# Patient Record
Sex: FEMALE | Race: WHITE | NOT HISPANIC OR LATINO | Employment: OTHER | URBAN - NONMETROPOLITAN AREA
[De-identification: names, ages, dates, MRNs, and addresses within clinical notes are randomized per-mention and may not be internally consistent; named-entity substitution may affect disease eponyms.]

---

## 2017-11-15 ENCOUNTER — TRANSCRIBE ORDERS (OUTPATIENT)
Dept: ADMINISTRATIVE | Facility: HOSPITAL | Age: 62
End: 2017-11-15

## 2017-11-15 DIAGNOSIS — Z12.39 SCREENING BREAST EXAMINATION: Primary | ICD-10-CM

## 2017-12-12 ENCOUNTER — HOSPITAL ENCOUNTER (OUTPATIENT)
Dept: MAMMOGRAPHY | Facility: HOSPITAL | Age: 62
Discharge: HOME | End: 2017-12-12
Admitting: INTERNAL MEDICINE

## 2017-12-12 DIAGNOSIS — Z12.39 SCREENING BREAST EXAMINATION: ICD-10-CM

## 2017-12-12 PROCEDURE — 77067 SCR MAMMO BI INCL CAD: CPT | Performed by: RADIOLOGY

## 2017-12-12 PROCEDURE — G0202 SCR MAMMO BI INCL CAD: HCPCS

## 2017-12-12 PROCEDURE — 77063 BREAST TOMOSYNTHESIS BI: CPT | Performed by: RADIOLOGY

## 2017-12-12 PROCEDURE — 77063 BREAST TOMOSYNTHESIS BI: CPT

## 2018-02-12 ENCOUNTER — TRANSCRIBE ORDERS (OUTPATIENT)
Dept: ADMINISTRATIVE | Facility: HOSPITAL | Age: 63
End: 2018-02-12

## 2018-02-12 ENCOUNTER — LAB (OUTPATIENT)
Dept: LAB | Facility: HOSPITAL | Age: 63
End: 2018-02-12

## 2018-02-12 DIAGNOSIS — Z79.01 LONG-TERM (CURRENT) USE OF ANTICOAGULANTS: ICD-10-CM

## 2018-02-12 DIAGNOSIS — R53.81 MALAISE: ICD-10-CM

## 2018-02-12 DIAGNOSIS — G47.00 INSOMNIA, UNSPECIFIED TYPE: ICD-10-CM

## 2018-02-12 DIAGNOSIS — E55.9 VITAMIN D DEFICIENCY: ICD-10-CM

## 2018-02-12 DIAGNOSIS — E55.9 VITAMIN D DEFICIENCY: Primary | ICD-10-CM

## 2018-02-12 DIAGNOSIS — E53.8 VITAMIN B12 DEFICIENCY: ICD-10-CM

## 2018-02-12 LAB
25(OH)D3 SERPL-MCNC: 24 NG/ML
ALBUMIN SERPL-MCNC: 4.5 G/DL (ref 3.4–4.8)
ALBUMIN/GLOB SERPL: 1.7 G/DL (ref 1.5–2.5)
ALP SERPL-CCNC: 90 U/L (ref 35–104)
ALT SERPL W P-5'-P-CCNC: 26 U/L (ref 10–36)
ANION GAP SERPL CALCULATED.3IONS-SCNC: 5.3 MMOL/L (ref 3.6–11.2)
AST SERPL-CCNC: 29 U/L (ref 10–30)
BASOPHILS # BLD AUTO: 0.04 10*3/MM3 (ref 0–0.3)
BASOPHILS NFR BLD AUTO: 0.6 % (ref 0–2)
BILIRUB SERPL-MCNC: 0.5 MG/DL (ref 0.2–1.8)
BUN BLD-MCNC: 10 MG/DL (ref 7–21)
BUN/CREAT SERPL: 15.4 (ref 7–25)
CALCIUM SPEC-SCNC: 9.4 MG/DL (ref 7.7–10)
CHLORIDE SERPL-SCNC: 108 MMOL/L (ref 99–112)
CHOLEST SERPL-MCNC: 190 MG/DL (ref 0–200)
CO2 SERPL-SCNC: 26.7 MMOL/L (ref 24.3–31.9)
CREAT BLD-MCNC: 0.65 MG/DL (ref 0.43–1.29)
DEPRECATED RDW RBC AUTO: 41.1 FL (ref 37–54)
EOSINOPHIL # BLD AUTO: 0.04 10*3/MM3 (ref 0–0.7)
EOSINOPHIL NFR BLD AUTO: 0.6 % (ref 0–5)
ERYTHROCYTE [DISTWIDTH] IN BLOOD BY AUTOMATED COUNT: 12.9 % (ref 11.5–14.5)
GFR SERPL CREATININE-BSD FRML MDRD: 92 ML/MIN/1.73
GLOBULIN UR ELPH-MCNC: 2.7 GM/DL
GLUCOSE BLD-MCNC: 102 MG/DL (ref 70–110)
HCT VFR BLD AUTO: 40.3 % (ref 37–47)
HDLC SERPL-MCNC: 50 MG/DL (ref 60–100)
HGB BLD-MCNC: 13.8 G/DL (ref 12–16)
IMM GRANULOCYTES # BLD: 0 10*3/MM3 (ref 0–0.03)
IMM GRANULOCYTES NFR BLD: 0 % (ref 0–0.5)
LDLC SERPL CALC-MCNC: 112 MG/DL (ref 0–100)
LDLC/HDLC SERPL: 2.24 {RATIO}
LYMPHOCYTES # BLD AUTO: 2.16 10*3/MM3 (ref 1–3)
LYMPHOCYTES NFR BLD AUTO: 33 % (ref 21–51)
MCH RBC QN AUTO: 30.1 PG (ref 27–33)
MCHC RBC AUTO-ENTMCNC: 34.2 G/DL (ref 33–37)
MCV RBC AUTO: 88 FL (ref 80–94)
MONOCYTES # BLD AUTO: 0.5 10*3/MM3 (ref 0.1–0.9)
MONOCYTES NFR BLD AUTO: 7.6 % (ref 0–10)
NEUTROPHILS # BLD AUTO: 3.81 10*3/MM3 (ref 1.4–6.5)
NEUTROPHILS NFR BLD AUTO: 58.2 % (ref 30–70)
OSMOLALITY SERPL CALC.SUM OF ELEC: 278.6 MOSM/KG (ref 273–305)
PLATELET # BLD AUTO: 220 10*3/MM3 (ref 130–400)
PMV BLD AUTO: 11.1 FL (ref 6–10)
POTASSIUM BLD-SCNC: 3.9 MMOL/L (ref 3.5–5.3)
PROT SERPL-MCNC: 7.2 G/DL (ref 6–8)
RBC # BLD AUTO: 4.58 10*6/MM3 (ref 4.2–5.4)
SODIUM BLD-SCNC: 140 MMOL/L (ref 135–153)
T4 FREE SERPL-MCNC: 1.29 NG/DL (ref 0.89–1.76)
TRIGL SERPL-MCNC: 139 MG/DL (ref 0–150)
TSH SERPL DL<=0.05 MIU/L-ACNC: 0.89 MIU/ML (ref 0.55–4.78)
VIT B12 BLD-MCNC: 245 PG/ML (ref 211–911)
VLDLC SERPL-MCNC: 27.8 MG/DL
WBC NRBC COR # BLD: 6.55 10*3/MM3 (ref 4.5–12.5)

## 2018-02-12 PROCEDURE — 84443 ASSAY THYROID STIM HORMONE: CPT

## 2018-02-12 PROCEDURE — 85025 COMPLETE CBC W/AUTO DIFF WBC: CPT

## 2018-02-12 PROCEDURE — 82306 VITAMIN D 25 HYDROXY: CPT

## 2018-02-12 PROCEDURE — 80061 LIPID PANEL: CPT

## 2018-02-12 PROCEDURE — 80053 COMPREHEN METABOLIC PANEL: CPT

## 2018-02-12 PROCEDURE — 82607 VITAMIN B-12: CPT

## 2018-02-12 PROCEDURE — 84439 ASSAY OF FREE THYROXINE: CPT

## 2018-02-12 PROCEDURE — 36415 COLL VENOUS BLD VENIPUNCTURE: CPT

## 2019-06-13 ENCOUNTER — HOSPITAL ENCOUNTER (OUTPATIENT)
Dept: MAMMOGRAPHY | Facility: HOSPITAL | Age: 64
Discharge: HOME | End: 2019-06-13
Admitting: INTERNAL MEDICINE

## 2019-06-13 DIAGNOSIS — Z12.39 SCREENING BREAST EXAMINATION: ICD-10-CM

## 2019-06-13 PROCEDURE — 77067 SCR MAMMO BI INCL CAD: CPT | Performed by: RADIOLOGY

## 2019-06-13 PROCEDURE — 77063 BREAST TOMOSYNTHESIS BI: CPT

## 2019-06-13 PROCEDURE — 77067 SCR MAMMO BI INCL CAD: CPT

## 2019-06-13 PROCEDURE — 77063 BREAST TOMOSYNTHESIS BI: CPT | Performed by: RADIOLOGY

## 2019-12-03 ENCOUNTER — TRANSCRIBE ORDERS (OUTPATIENT)
Dept: ADMINISTRATIVE | Facility: HOSPITAL | Age: 64
End: 2019-12-03

## 2019-12-03 DIAGNOSIS — Z78.0 POSTMENOPAUSAL: Primary | ICD-10-CM

## 2019-12-17 ENCOUNTER — APPOINTMENT (OUTPATIENT)
Dept: ULTRASOUND IMAGING | Facility: HOSPITAL | Age: 64
End: 2019-12-17

## 2020-09-14 ENCOUNTER — HOSPITAL ENCOUNTER (OUTPATIENT)
Dept: MAMMOGRAPHY | Facility: HOSPITAL | Age: 65
Discharge: HOME | End: 2020-09-14
Admitting: INTERNAL MEDICINE

## 2020-09-14 DIAGNOSIS — Z12.31 VISIT FOR SCREENING MAMMOGRAM: ICD-10-CM

## 2020-09-14 PROCEDURE — 77063 BREAST TOMOSYNTHESIS BI: CPT

## 2020-09-14 PROCEDURE — 77067 SCR MAMMO BI INCL CAD: CPT | Performed by: RADIOLOGY

## 2020-09-14 PROCEDURE — 77063 BREAST TOMOSYNTHESIS BI: CPT | Performed by: RADIOLOGY

## 2020-09-14 PROCEDURE — 77067 SCR MAMMO BI INCL CAD: CPT

## 2021-02-11 ENCOUNTER — IMMUNIZATION (OUTPATIENT)
Dept: VACCINE CLINIC | Facility: HOSPITAL | Age: 66
End: 2021-02-11

## 2021-02-11 PROCEDURE — 91300 HC SARSCOV02 VAC 30MCG/0.3ML IM: CPT | Performed by: INTERNAL MEDICINE

## 2021-02-11 PROCEDURE — 0001A: CPT | Performed by: INTERNAL MEDICINE

## 2021-03-04 ENCOUNTER — IMMUNIZATION (OUTPATIENT)
Dept: VACCINE CLINIC | Facility: HOSPITAL | Age: 66
End: 2021-03-04

## 2021-03-04 PROCEDURE — 91300 HC SARSCOV02 VAC 30MCG/0.3ML IM: CPT | Performed by: INTERNAL MEDICINE

## 2021-03-04 PROCEDURE — 0002A: CPT | Performed by: INTERNAL MEDICINE

## 2021-09-17 ENCOUNTER — HOSPITAL ENCOUNTER (OUTPATIENT)
Dept: MAMMOGRAPHY | Facility: HOSPITAL | Age: 66
Discharge: HOME | End: 2021-09-17
Admitting: INTERNAL MEDICINE

## 2021-09-17 DIAGNOSIS — Z12.31 VISIT FOR SCREENING MAMMOGRAM: ICD-10-CM

## 2021-09-17 PROCEDURE — 77067 SCR MAMMO BI INCL CAD: CPT | Performed by: RADIOLOGY

## 2021-09-17 PROCEDURE — 77063 BREAST TOMOSYNTHESIS BI: CPT

## 2021-09-17 PROCEDURE — 77063 BREAST TOMOSYNTHESIS BI: CPT | Performed by: RADIOLOGY

## 2021-09-17 PROCEDURE — 77067 SCR MAMMO BI INCL CAD: CPT

## 2022-06-05 ENCOUNTER — APPOINTMENT (OUTPATIENT)
Dept: GENERAL RADIOLOGY | Facility: HOSPITAL | Age: 67
End: 2022-06-05

## 2022-06-05 ENCOUNTER — HOSPITAL ENCOUNTER (EMERGENCY)
Facility: HOSPITAL | Age: 67
Discharge: HOME | End: 2022-06-05
Admitting: EMERGENCY MEDICINE

## 2022-06-05 ENCOUNTER — APPOINTMENT (OUTPATIENT)
Dept: CT IMAGING | Facility: HOSPITAL | Age: 67
End: 2022-06-05

## 2022-06-05 VITALS
OXYGEN SATURATION: 99 % | DIASTOLIC BLOOD PRESSURE: 82 MMHG | HEART RATE: 86 BPM | WEIGHT: 125 LBS | BODY MASS INDEX: 24.54 KG/M2 | HEIGHT: 60 IN | SYSTOLIC BLOOD PRESSURE: 174 MMHG | TEMPERATURE: 98 F | RESPIRATION RATE: 18 BRPM

## 2022-06-05 DIAGNOSIS — S42.91XA CLOSED FRACTURE OF RIGHT SHOULDER, INITIAL ENCOUNTER: Primary | ICD-10-CM

## 2022-06-05 PROCEDURE — 73200 CT UPPER EXTREMITY W/O DYE: CPT

## 2022-06-05 PROCEDURE — 73030 X-RAY EXAM OF SHOULDER: CPT

## 2022-06-05 PROCEDURE — 73130 X-RAY EXAM OF HAND: CPT

## 2022-06-05 PROCEDURE — 73090 X-RAY EXAM OF FOREARM: CPT

## 2022-06-05 PROCEDURE — 99283 EMERGENCY DEPT VISIT LOW MDM: CPT

## 2022-06-05 PROCEDURE — 73080 X-RAY EXAM OF ELBOW: CPT

## 2022-06-05 PROCEDURE — 25010000002 MORPHINE PER 10 MG: Performed by: EMERGENCY MEDICINE

## 2022-06-05 PROCEDURE — 96372 THER/PROPH/DIAG INJ SC/IM: CPT

## 2022-06-05 PROCEDURE — 25010000002 HYDROMORPHONE 1 MG/ML SOLUTION: Performed by: EMERGENCY MEDICINE

## 2022-06-05 RX ORDER — HYDROCODONE BITARTRATE AND ACETAMINOPHEN 7.5; 325 MG/1; MG/1
1 TABLET ORAL EVERY 6 HOURS PRN
Qty: 12 TABLET | Refills: 0 | Status: SHIPPED | OUTPATIENT
Start: 2022-06-05

## 2022-06-05 RX ADMIN — MORPHINE SULFATE 4 MG: 4 INJECTION, SOLUTION INTRAMUSCULAR; INTRAVENOUS at 21:25

## 2022-06-05 RX ADMIN — HYDROMORPHONE HYDROCHLORIDE 1 MG: 1 INJECTION, SOLUTION INTRAMUSCULAR; INTRAVENOUS; SUBCUTANEOUS at 23:16

## 2022-06-06 NOTE — ED PROVIDER NOTES
Subjective   67 yo female patient presents to the ED after she took a fall tonight. Patient states that she slipped in her flip flops going down a hill and landed on her right arm. Patient is complaining of pain in her right shoulder all the way to her forearm. Pt reports decreased ROM and only alleviating pain is holding it at a 90degree against her body. There was no other injuries. No head injury or LOC.       History provided by:  Patient   used: No        Review of Systems   Constitutional: Negative.    HENT: Negative.    Eyes: Negative.    Respiratory: Negative.    Cardiovascular: Negative.    Gastrointestinal: Negative.    Endocrine: Negative.    Genitourinary: Negative.    Musculoskeletal:        Right arm pain    Skin: Negative.    Allergic/Immunologic: Negative.    Neurological: Negative.    Hematological: Negative.    Psychiatric/Behavioral: Negative.    All other systems reviewed and are negative.      No past medical history on file.    No Known Allergies    No past surgical history on file.    Family History   Problem Relation Age of Onset   • Breast cancer Neg Hx        Social History     Socioeconomic History   • Marital status:            Objective   Physical Exam  Vitals and nursing note reviewed.   Constitutional:       Appearance: Normal appearance. She is normal weight.   HENT:      Head: Normocephalic and atraumatic.      Right Ear: External ear normal.      Left Ear: External ear normal.      Nose: Nose normal.      Mouth/Throat:      Mouth: Mucous membranes are moist.      Pharynx: Oropharynx is clear.   Eyes:      Extraocular Movements: Extraocular movements intact.      Conjunctiva/sclera: Conjunctivae normal.      Pupils: Pupils are equal, round, and reactive to light.   Cardiovascular:      Rate and Rhythm: Normal rate and regular rhythm.      Pulses: Normal pulses.      Heart sounds: Normal heart sounds.   Pulmonary:      Effort: Pulmonary effort is normal.       Breath sounds: Normal breath sounds.   Abdominal:      General: Abdomen is flat. Bowel sounds are normal.      Palpations: Abdomen is soft.   Musculoskeletal:      Right shoulder: Tenderness and bony tenderness present. No swelling, deformity, effusion, laceration or crepitus. Decreased range of motion. Decreased strength. Normal pulse.      Right upper arm: Tenderness present.      Right elbow: Decreased range of motion.      Right forearm: Tenderness present.      Cervical back: Normal range of motion and neck supple.      Comments: Pt is N/ V intact.    Skin:     General: Skin is warm and dry.      Capillary Refill: Capillary refill takes less than 2 seconds.   Neurological:      General: No focal deficit present.      Mental Status: She is alert and oriented to person, place, and time. Mental status is at baseline.         Procedures           ED Course  ED Course as of 06/08/22 1059   Sun Jun 05, 2022   2203 XR Forearm 2 View Right     IMPRESSION:  Negative right forearm.     Signer Name: Gibson Gusman MD   Signed: 6/5/2022 10:00 PM   Workstation Name: [email or web address]    [facility]          Specimen Collected: 06/05/22 22:00 Last Resulted: 06/05/22 22:00         [ML]   2203 XR Hand 3+ View Right  IMPRESSION:  Negative right hand.     Signer Name: Gibson Gusman MD   Signed: 6/5/2022 10:00 PM   Workstation Name: [email or web address]    [facility]          Specimen Collected: 06/05/22 22:00 Last Resulted: 06/05/22 22:00         [ML]   2203 XR Shoulder 2+ View Right  IMPRESSION:  Negative right shoulder.     Signer Name: Gibson Gusman MD   Signed: 6/5/2022 10:00 PM   Workstation Name: [email or web address]    [facility] [ML]   2204 XR Elbow 3+ View Right  IMPRESSION:  Negative right elbow. Positioning is not optimal     Signer Name: Gibson Gusman MD   Signed: 6/5/2022 10:01 PM   Workstation Name: [email or web address]    [facility]          Specimen Collected:  06/05/22 22:01 Last Resulted: 06/05/22 22:01     Order Details    View Encounter    Lab and Collection Details    Routing    Result History          Result Care Coordination      Patient Communication      6/6/2022 12:03 PM Release Now  Not seen Back to Top          Relevant Priors    Procedure Date Study Status PACS Link  Mammo Screening Digital Tomosynthesis Bilateral With CAD 09/17/2021 Final  Images  Mammo Screening Digital Tomosynthesis Bilateral With CAD 09/14/2020 Final  Images  Mammo Screening Digital Tomosynthesis Bilateral With CAD 06/13/2019 Final  Images  Mammo Screening Digital Tomosynthesis Bilateral With CAD 12/12/2017 Final  Images  MAMMOGRAPHY SCREENING DIGITAL TOMOSYNTHESIS BILATERAL 12/18/2014 Final  Images     [ML]   2220 Discussed case with Dr. Rivera - he has seen the patient and recommends a CT scan upper extremity wo contrast.  [ML]   2255 CT Upper Extremity Right Without Contrast  IMPRESSION:  Nondisplaced vertical longitudinal fracture through the anterior greater tuberosity.     No other proximal humeral fracture identified.     Rotator cuff appears intact by CT.     Signer Name: MIR Urban MD   Signed: 6/5/2022 10:52 PM   Workstation Name: [email or web address]    [facility]          Specimen Collected: 06/05/22 22:52 Last Resulted: 06/05/22 22:52         [ML]   2300 Patient placed in sling and swath and instructed to f/u with ortho. Discussed sx and red flags that would warrant return to the ED.  [ML]      ED Course User Index  [ML] Juliana May PA                                                 MDM  Number of Diagnoses or Management Options     Amount and/or Complexity of Data Reviewed  Tests in the radiology section of CPT®: ordered and reviewed  Discuss the patient with other providers: yes    Risk of Complications, Morbidity, and/or Mortality  Presenting problems: low  Diagnostic procedures: low  Management options: low    Patient Progress  Patient  progress: improved      Final diagnoses:   Closed fracture of right shoulder, initial encounter       ED Disposition  ED Disposition     ED Disposition   Discharge    Condition   Stable    Comment   --             Ladi Rehman MD  [address] [facility] [address] [phone]    Schedule an appointment as soon as possible for a visit in 1 day      Padilla Greene MD  [address] [place] [id number] [phone]    Schedule an appointment as soon as possible for a visit in 1 day           Medication List      New Prescriptions    HYDROcodone-acetaminophen 7.5-325 MG per tablet  Commonly known as: NORCO  Take 1 tablet by mouth Every 6 (Six) Hours As Needed for Moderate Pain .           Where to Get Your Medications      These medications were sent to [facility] [address] [place] [address] - [address] [place] [address] - [phone] [place] [phone]   [address] [id number]    Phone: [phone]   · HYDROcodone-acetaminophen 7.5-325 MG per tablet          Juliana May PA  06/08/22 0102

## 2022-06-06 NOTE — ED NOTES
Patient monitored 15 minutes after injection for reaction, No reaction noted, Pt Respirations even and unlabored, skin pink, dry, intact, and NADN

## 2022-06-06 NOTE — ED NOTES
Patient BP checked after pain medication provided some relief of pain, BP has decreased to 174/82.

## 2022-11-04 DIAGNOSIS — M25.561 RIGHT KNEE PAIN, UNSPECIFIED CHRONICITY: Primary | ICD-10-CM

## 2022-11-11 ENCOUNTER — HOSPITAL ENCOUNTER (OUTPATIENT)
Dept: GENERAL RADIOLOGY | Facility: HOSPITAL | Age: 67
Discharge: HOME | End: 2022-11-11
Admitting: PHYSICIAN ASSISTANT

## 2022-11-11 ENCOUNTER — OFFICE VISIT (OUTPATIENT)
Dept: ORTHOPEDIC SURGERY | Facility: CLINIC | Age: 67
End: 2022-11-11

## 2022-11-11 VITALS
SYSTOLIC BLOOD PRESSURE: 148 MMHG | HEART RATE: 85 BPM | DIASTOLIC BLOOD PRESSURE: 78 MMHG | HEIGHT: 60 IN | WEIGHT: 120.8 LBS | BODY MASS INDEX: 23.71 KG/M2

## 2022-11-11 DIAGNOSIS — M25.561 RIGHT KNEE PAIN, UNSPECIFIED CHRONICITY: ICD-10-CM

## 2022-11-11 DIAGNOSIS — M17.11 PRIMARY OSTEOARTHRITIS OF RIGHT KNEE: Primary | ICD-10-CM

## 2022-11-11 PROCEDURE — 99203 OFFICE O/P NEW LOW 30 MIN: CPT | Performed by: PHYSICIAN ASSISTANT

## 2022-11-11 PROCEDURE — 73562 X-RAY EXAM OF KNEE 3: CPT | Performed by: RADIOLOGY

## 2022-11-11 PROCEDURE — 73562 X-RAY EXAM OF KNEE 3: CPT

## 2022-11-11 RX ORDER — ZOLPIDEM TARTRATE 10 MG/1
10 TABLET ORAL
COMMUNITY
Start: 2022-10-20

## 2022-11-11 RX ORDER — LEVOTHYROXINE SODIUM 0.03 MG/1
25 TABLET ORAL DAILY
COMMUNITY
Start: 2022-09-26

## 2022-11-11 RX ORDER — SENNOSIDES 8.6 MG
650 CAPSULE ORAL EVERY 8 HOURS PRN
COMMUNITY

## 2022-11-11 RX ORDER — ERGOCALCIFEROL 1.25 MG/1
50000 CAPSULE ORAL WEEKLY
COMMUNITY
Start: 2022-08-18

## 2022-11-11 RX ORDER — PRAVASTATIN SODIUM 40 MG
40 TABLET ORAL DAILY
COMMUNITY
Start: 2022-08-29

## 2022-11-28 NOTE — PROGRESS NOTES
[facility] Patient Visit          Patient: KRISTEN Mcdaniel  YOB: 1955  Date of Encounter: 11/11/2022  PCP: Ladi Rehman MD      Subjective     Chief Complaint   Patient presents with   • Right Knee - Initial Evaluation, Edema, Pain           History of Present Illness:     KRISTEN Mcdaniel is a 67 y.o. female presents today presents as result of right knee pain approximate 4 weeks history.  Patient reports that she had had intermittent knee pain in the past however this is worsened over the last 4 weeks.  She reports pain to the anterior right medial knee with intermittent swelling with difficulty upon range of motion.  She reports pain more so whenever she is prolonged sitting or prolonged standing.  She reports a dull throbbing aching sensation with occasional sharp stabbing sensation with range of motion.  She reports the knee giving out on her at times.  Denies any specific injury or any paresthesias.  She has undergone no conservative treatment options other than previously mentioned.        There is no problem list on file for this patient.    Past Medical History:   Diagnosis Date   • Osteoporosis    • Sleep apnea      History reviewed. No pertinent surgical history.  Social History     Occupational History   • Not on file   Tobacco Use   • Smoking status: Never   • Smokeless tobacco: Never   Vaping Use   • Vaping Use: Never used   Substance and Sexual Activity   • Alcohol use: Not on file   • Drug use: Never   • Sexual activity: Defer    KRISTEN Mcdaniel  reports that she has never smoked. She has never used smokeless tobacco.. I have educated her on the risk of diseases from using tobacco products such as cancer, COPD and heart disease.       Social History     Social History Narrative   • Not on file     Family History   Problem Relation Age of Onset   • Breast cancer Neg Hx      Current Outpatient Medications   Medication Sig Dispense Refill   • acetaminophen  "(TYLENOL) 650 MG 8 hr tablet Take 1 tablet by mouth Every 8 (Eight) Hours As Needed for Mild Pain.     • Diclofenac Sodium (VOLTAREN) 1 % gel gel APPLY 2 GRAMS BY TOPICAL ROUTE 2 TIMES PER DAY TOP THE AFFECTED AREA     • levothyroxine (SYNTHROID, LEVOTHROID) 25 MCG tablet Take 1 tablet by mouth Daily.     • pravastatin (PRAVACHOL) 40 MG tablet Take 1 tablet by mouth Daily.     • zolpidem (AMBIEN) 10 MG tablet Take 1 tablet by mouth every night at bedtime.     • HYDROcodone-acetaminophen (NORCO) 7.5-325 MG per tablet Take 1 tablet by mouth Every 6 (Six) Hours As Needed for Moderate Pain . 12 tablet 0   • vitamin D (ERGOCALCIFEROL) 1.25 MG (50414 UT) capsule capsule Take 1 capsule by mouth 1 (One) Time Per Week.       No current facility-administered medications for this visit.     No Known Allergies         Review of Systems   Constitutional: Negative.   HENT: Negative.    Eyes: Negative.    Cardiovascular: Negative.    Respiratory: Negative.    Endocrine: Negative.    Hematologic/Lymphatic: Negative.    Skin: Negative.    Musculoskeletal:        Pertinent positives listed in HPI   Gastrointestinal: Negative.    Genitourinary: Negative.    Neurological: Negative.    Psychiatric/Behavioral: Negative.    Allergic/Immunologic: Negative.          Objective      Vitals:    11/11/22 1013   BP: 148/78   Pulse: 85   Weight: 54.8 kg (120 lb 12.8 oz)   Height: 152.4 cm (60\")      BMI is within normal parameters. No other follow-up for BMI required.      Physical Exam  Vitals and nursing note reviewed.   Constitutional:       General: She is not in acute distress.     Appearance: She is not ill-appearing.   HENT:      Head: Normocephalic and atraumatic.      Right Ear: External ear normal.      Left Ear: External ear normal.      Nose: Nose normal. No congestion or rhinorrhea.   Eyes:      Extraocular Movements: Extraocular movements intact.      Conjunctiva/sclera: Conjunctivae normal.      Pupils: Pupils are equal, round, and " reactive to light.   Cardiovascular:      Rate and Rhythm: Normal rate.      Pulses: Normal pulses.   Pulmonary:      Effort: Pulmonary effort is normal. No respiratory distress.      Breath sounds: No stridor.   Abdominal:      General: There is no distension.   Musculoskeletal:      Cervical back: Normal range of motion.      Comments: Right knee on examination today reveals obvious varus deformity with laxity upon valgus stress.  Patient exhibits full flexion and extension with scant effusion.  Patellofemoral crepitus present.  Lachman and drawer testing negative.  Bindu's and Apley's grind testing negative.  Neurovascular status grossly intact right lower extremity.    Skin:     General: Skin is warm and dry.      Capillary Refill: Capillary refill takes less than 2 seconds.   Neurological:      General: No focal deficit present.      Mental Status: She is alert and oriented to person, place, and time.   Psychiatric:         Mood and Affect: Mood normal.         Behavior: Behavior normal.         Thought Content: Thought content normal.         Judgment: Judgment normal.               Radiology:      XR Knee 3 View Right    Result Date: 11/11/2022    Arthritic change, but no acute bony abnormality.  This report was finalized on 11/11/2022 9:45 AM by Dr. Jayson Juárez MD.              Assessment/Plan      No diagnosis found.    67-year-old female with notable right knee osteoarthritis fairly abrupt onset of symptoms with medial joint space collapse and pseudolaxity on exam.  As result of this the patient was provided with a right knee medial  brace and was encouraged to implement conservative treatment options.  The patient will be preauthorized for viscosupplementation and the patient will return back in 4 weeks for further evaluation of the efficacy of the bracing and to discuss infusion viscosupplementation.  She would like to exhaust all conservative treatment options before discussing total knee  arthroplasty.                      This document was signed by Trevor Jones PA-C November 11, 2022    CC: Ladi Rehman MD

## 2022-12-13 ENCOUNTER — OFFICE VISIT (OUTPATIENT)
Dept: ORTHOPEDIC SURGERY | Facility: CLINIC | Age: 67
End: 2022-12-13

## 2022-12-13 VITALS — BODY MASS INDEX: 23.56 KG/M2 | WEIGHT: 120 LBS | HEIGHT: 60 IN

## 2022-12-13 DIAGNOSIS — M25.561 RIGHT KNEE PAIN, UNSPECIFIED CHRONICITY: ICD-10-CM

## 2022-12-13 DIAGNOSIS — M17.11 PRIMARY OSTEOARTHRITIS OF RIGHT KNEE: Primary | ICD-10-CM

## 2022-12-13 PROCEDURE — 20610 DRAIN/INJ JOINT/BURSA W/O US: CPT | Performed by: PHYSICIAN ASSISTANT

## 2022-12-13 RX ORDER — METHYLPREDNISOLONE ACETATE 80 MG/ML
80 INJECTION, SUSPENSION INTRA-ARTICULAR; INTRALESIONAL; INTRAMUSCULAR; SOFT TISSUE
Status: COMPLETED | OUTPATIENT
Start: 2022-12-13 | End: 2022-12-13

## 2022-12-13 RX ORDER — LIDOCAINE HYDROCHLORIDE 10 MG/ML
5 INJECTION, SOLUTION EPIDURAL; INFILTRATION; INTRACAUDAL; PERINEURAL
Status: COMPLETED | OUTPATIENT
Start: 2022-12-13 | End: 2022-12-13

## 2022-12-13 RX ADMIN — METHYLPREDNISOLONE ACETATE 80 MG: 80 INJECTION, SUSPENSION INTRA-ARTICULAR; INTRALESIONAL; INTRAMUSCULAR; SOFT TISSUE at 14:42

## 2022-12-13 RX ADMIN — LIDOCAINE HYDROCHLORIDE 5 ML: 10 INJECTION, SOLUTION EPIDURAL; INFILTRATION; INTRACAUDAL; PERINEURAL at 14:42

## 2022-12-13 NOTE — PROGRESS NOTES
[facility] Established Patient Visit          Patient: KRISTEN Mcdaniel  YOB: 1955  Date of Encounter: 12/13/2022  PCP: Ladi Rehman MD      Subjective     Chief Complaint   Patient presents with   • Right Knee - Follow-up, Pain, Edema           History of Present Illness:     KRISTEN Mcdaniel is a 67 y.o. female presents today presents as result of right knee pain/osteoarthritis.  Patient last office visit received medial  bracing with near complete resolution of pain symptoms.  She has began to have return of medial knee pain worse upon range of motion weightbearing or twisting/turning motions.  Patient reports dull throbbing aching sensation at rest.  She has yet to undergo intra-articular steroid injection.  Patient reports no other new complaints.  Denies any paresthesias.           There is no problem list on file for this patient.    Past Medical History:   Diagnosis Date   • Osteoporosis    • Sleep apnea      History reviewed. No pertinent surgical history.  Social History     Occupational History   • Not on file   Tobacco Use   • Smoking status: Never   • Smokeless tobacco: Never   Vaping Use   • Vaping Use: Never used   Substance and Sexual Activity   • Alcohol use: Never   • Drug use: Never   • Sexual activity: Defer    KRISTEN Mcdaniel  reports that she has never smoked. She has never used smokeless tobacco.. I have educated her on the risk of diseases from using tobacco products such as cancer, COPD and heart disease.       Social History     Social History Narrative   • Not on file     Family History   Problem Relation Age of Onset   • Breast cancer Neg Hx      Current Outpatient Medications   Medication Sig Dispense Refill   • Diclofenac Sodium (VOLTAREN) 1 % gel gel APPLY 2 GRAMS BY TOPICAL ROUTE 2 TIMES PER DAY TOP THE AFFECTED AREA     • levothyroxine (SYNTHROID, LEVOTHROID) 25 MCG tablet Take 1 tablet by mouth Daily.     • pravastatin (PRAVACHOL) 40 MG  "tablet Take 1 tablet by mouth Daily.     • vitamin D (ERGOCALCIFEROL) 1.25 MG (45995 UT) capsule capsule Take 1 capsule by mouth 1 (One) Time Per Week.     • zolpidem (AMBIEN) 10 MG tablet Take 1 tablet by mouth every night at bedtime.     • acetaminophen (TYLENOL) 650 MG 8 hr tablet Take 1 tablet by mouth Every 8 (Eight) Hours As Needed for Mild Pain.     • HYDROcodone-acetaminophen (NORCO) 7.5-325 MG per tablet Take 1 tablet by mouth Every 6 (Six) Hours As Needed for Moderate Pain . 12 tablet 0     No current facility-administered medications for this visit.     No Known Allergies         Review of Systems   Constitutional: Negative.   HENT: Negative.    Eyes: Negative.    Cardiovascular: Negative.    Respiratory: Negative.    Endocrine: Negative.    Hematologic/Lymphatic: Negative.    Skin: Negative.    Musculoskeletal:        Pertinent positives listed in HPI   Gastrointestinal: Negative.    Genitourinary: Negative.    Neurological: Negative.    Psychiatric/Behavioral: Negative.    Allergic/Immunologic: Negative.          Objective      Vitals:    12/13/22 0930   Weight: 54.4 kg (120 lb)   Height: 152.4 cm (60\")      BMI is within normal parameters. No other follow-up for BMI required.      Physical Exam  Vitals and nursing note reviewed.   Constitutional:       General: She is not in acute distress.     Appearance: She is not ill-appearing.   HENT:      Head: Normocephalic and atraumatic.      Right Ear: External ear normal.      Left Ear: External ear normal.      Nose: Nose normal. No congestion or rhinorrhea.   Eyes:      Extraocular Movements: Extraocular movements intact.      Conjunctiva/sclera: Conjunctivae normal.      Pupils: Pupils are equal, round, and reactive to light.   Cardiovascular:      Rate and Rhythm: Normal rate.      Pulses: Normal pulses.   Pulmonary:      Effort: Pulmonary effort is normal. No respiratory distress.      Breath sounds: No stridor.   Abdominal:      General: There is no " distension.   Musculoskeletal:      Cervical back: Normal range of motion.      Comments: Right knee on examination today reveals obvious varus deformity with laxity upon valgus stress.  Patient exhibits full flexion and extension with scant effusion.  Patellofemoral crepitus present.  Lachman and drawer testing negative.  Bindu's and Apley's grind testing negative.  Neurovascular status grossly intact right lower extremity.    Skin:     General: Skin is warm and dry.      Capillary Refill: Capillary refill takes less than 2 seconds.   Neurological:      General: No focal deficit present.      Mental Status: She is alert and oriented to person, place, and time.   Psychiatric:         Mood and Affect: Mood normal.         Behavior: Behavior normal.         Thought Content: Thought content normal.         Judgment: Judgment normal.                 Radiology:      XR Knee 3 View Right    Result Date: 11/11/2022    Arthritic change, but no acute bony abnormality.  This report was finalized on 11/11/2022 9:45 AM by Dr. Jayson Juárez MD.                Assessment/Plan        ICD-10-CM ICD-9-CM   1. Right knee pain, unspecified chronicity  M25.561 719.46   2. Primary osteoarthritis of right knee  M17.11 715.16         67-year-old female with notable right knee osteoarthritis with tricompartmental changes and medial joint space collapse with pseudolaxity on exam.  The patient was provided with a medial  brace which she attributes significant pain and symptoms reduction.  She still has some dull throbbing aching pain to the anterior medial aspect of the knee worse upon repetitive activities.  As result of this the patient was provided today with an intra-articular right knee steroidal injection 80 mg Depo-Medrol with lidocaine block.  She tolerated this procedure well.  She was instructed to return back in 4 weeks for further evaluation of the efficacy of the conservative treatment.  Patient will also be  preauthorized for visco supplementation.    Large Joint Arthrocentesis: R knee  Date/Time: 12/13/2022 2:42 PM  Consent given by: patient  Site marked: site marked  Supporting Documentation  Indications: pain and diagnostic evaluation   Procedure Details  Location: knee - R knee  Needle size: 25 G  Approach: anterolateral  Medications administered: 80 mg methylPREDNISolone acetate 80 MG/ML; 5 mL lidocaine PF 1% 1 %  Patient tolerance: patient tolerated the procedure well with no immediate complications                        This document was signed by Trevor Jones PA-C December 13, 2022    CC: Ladi Rehman MD      Dictated Utilizing Dragon Dictation:   Please note that portions of this note were completed with a voice recognition program.   Part of this note may be an electronic transcription/translation of spoken language to printed text using the Dragon Dictation System.

## 2023-01-10 ENCOUNTER — OFFICE VISIT (OUTPATIENT)
Dept: ORTHOPEDIC SURGERY | Facility: CLINIC | Age: 68
End: 2023-01-10
Payer: MEDICARE

## 2023-01-10 VITALS — WEIGHT: 120 LBS | HEIGHT: 60 IN | BODY MASS INDEX: 23.56 KG/M2

## 2023-01-10 DIAGNOSIS — M17.11 PRIMARY OSTEOARTHRITIS OF RIGHT KNEE: ICD-10-CM

## 2023-01-10 DIAGNOSIS — M25.561 RIGHT KNEE PAIN, UNSPECIFIED CHRONICITY: Primary | ICD-10-CM

## 2023-01-10 PROCEDURE — 99213 OFFICE O/P EST LOW 20 MIN: CPT | Performed by: PHYSICIAN ASSISTANT

## 2023-01-10 RX ORDER — NAPROXEN SODIUM 220 MG
220 TABLET ORAL 2 TIMES DAILY PRN
COMMUNITY

## 2023-01-10 NOTE — PROGRESS NOTES
[facility] [place] [facility] Establ[facility] Patient Visit          Patient: KRISTEN Mcdaniel  YOB: 1955  Date of Encounter: 12/13/2022  PCP: Ladi Rehman MD      Subjective     Chief Complaint   Patient presents with   • Right Knee - Follow-up, Pain           History of Present Illness:     KRISTEN Mcdaniel is a 67 y.o. female presents today presents as result of right knee pain/osteoarthritis.  Patient has done well with previous injection as well as medial  bracing and activity modification.  As result of this the patient would like to discuss the potential for viscosupplementation and other conservative treatment options to forego total knee arthroplasty.  Patient states that she is having some improvement and decrease findings with flexion and abduction of the knees.  Patient reports no other new complaints.  Denies any paresthesias.           There is no problem list on file for this patient.    Past Medical History:   Diagnosis Date   • Osteoporosis    • Sleep apnea      History reviewed. No pertinent surgical history.  Social History     Occupational History   • Not on file   Tobacco Use   • Smoking status: Never   • Smokeless tobacco: Never   Vaping Use   • Vaping Use: Never used   Substance and Sexual Activity   • Alcohol use: Never   • Drug use: Never   • Sexual activity: Defer    KRISTEN Mcdaniel  reports that she has never smoked. She has never used smokeless tobacco.. I have educated her on the risk of diseases from using tobacco products such as cancer, COPD and heart disease.       Social History     Social History Narrative   • Not on file     Family History   Problem Relation Age of Onset   • Breast cancer Neg Hx      Current Outpatient Medications   Medication Sig Dispense Refill   • acetaminophen (TYLENOL) 650 MG 8 hr tablet Take 1 tablet by mouth Every 8 (Eight) Hours As Needed for Mild Pain.     • Diclofenac Sodium (VOLTAREN) 1 % gel gel APPLY 2 GRAMS BY TOPICAL ROUTE 2  TIMES PER DAY TOP THE AFFECTED AREA     • levothyroxine (SYNTHROID, LEVOTHROID) 25 MCG tablet Take 1 tablet by mouth Daily.     • naproxen sodium (ALEVE) 220 MG tablet Take 220 mg by mouth 2 (Two) Times a Day As Needed.     • pravastatin (PRAVACHOL) 40 MG tablet Take 1 tablet by mouth Daily.     • zolpidem (AMBIEN) 10 MG tablet Take 1 tablet by mouth every night at bedtime.     • HYDROcodone-acetaminophen (NORCO) 7.5-325 MG per tablet Take 1 tablet by mouth Every 6 (Six) Hours As Needed for Moderate Pain . 12 tablet 0   • vitamin D (ERGOCALCIFEROL) 1.25 MG (29163 UT) capsule capsule Take 1 capsule by mouth 1 (One) Time Per Week.       No current facility-administered medications for this visit.     No Known Allergies         Review of Systems   Constitutional: Negative.   HENT: Negative.    Eyes: Negative.    Cardiovascular: Negative.    Respiratory: Negative.    Endocrine: Negative.    Hematologic/Lymphatic: Negative.    Skin: Negative.    Musculoskeletal:        Pertinent positives listed in HPI   Gastrointestinal: Negative.    Genitourinary: Negative.    Neurological: Negative.    Psychiatric/Behavioral: Negative.    Allergic/Immunologic: Negative.          Objective      Vitals:    01/10/23 1001   Weight: 54.4 kg (120 lb)   Height: 152.4 cm (60\")      BMI is within normal parameters. No other follow-up for BMI required.      Physical Exam  Vitals and nursing note reviewed.   Constitutional:       General: She is not in acute distress.     Appearance: She is not ill-appearing.   HENT:      Head: Normocephalic and atraumatic.      Right Ear: External ear normal.      Left Ear: External ear normal.      Nose: Nose normal. No congestion or rhinorrhea.   Eyes:      Extraocular Movements: Extraocular movements intact.      Conjunctiva/sclera: Conjunctivae normal.      Pupils: Pupils are equal, round, and reactive to light.   Cardiovascular:      Rate and Rhythm: Normal rate.      Pulses: Normal pulses.   Pulmonary:       Effort: Pulmonary effort is normal. No respiratory distress.      Breath sounds: No stridor.   Abdominal:      General: There is no distension.   Musculoskeletal:      Cervical back: Normal range of motion.      Comments: Right knee on examination today reveals obvious varus deformity with laxity upon valgus stress.  Patient exhibits full flexion and extension with scant effusion.  Patellofemoral crepitus present. Mild medial jointline tenderness. Lachman and drawer testing negative.  Bindu's and Apley's grind testing negative.  Neurovascular status grossly intact right lower extremity.    Skin:     General: Skin is warm and dry.      Capillary Refill: Capillary refill takes less than 2 seconds.   Neurological:      General: No focal deficit present.      Mental Status: She is alert and oriented to person, place, and time.   Psychiatric:         Mood and Affect: Mood normal.         Behavior: Behavior normal.         Thought Content: Thought content normal.         Judgment: Judgment normal.             Radiology:      XR Knee 3 View Right    Result Date: 11/11/2022    Arthritic change, but no acute bony abnormality.  This report was finalized on 11/11/2022 9:45 AM by Dr. Jayson Juárez MD.                Assessment/Plan        ICD-10-CM ICD-9-CM   1. Right knee pain, unspecified chronicity  M25.561 719.46   2. Primary osteoarthritis of right knee  M17.11 715.16       67-year-old female with notable right knee osteoarthritis and tricompartmental changes with medial joint space collapse and laxity.  Patient will continue with conservative treatment options and will continue with the  bracing.  She was instructed to return back in 4 weeks in which we discussed the possibility likelihood of viscosupplementation as she has responded well in the past to intra-articular steroidal injections as well as the bracing.  We will continue to exhaust all conservative treatment options before discussing total knee  arthroplasty.  Patient is agreeable and knowing of current treatment plan of care.                This document was signed by Trevor Jones PA-C January 10, 2023    CC: Ladi Rehman MD      Dictated Utilizing Dragon Dictation:   Please note that portions of this note were completed with a voice recognition program.   Part of this note may be an electronic transcription/translation of spoken language to printed text using the Dragon Dictation System.

## 2023-02-09 ENCOUNTER — OFFICE VISIT (OUTPATIENT)
Dept: ORTHOPEDIC SURGERY | Facility: CLINIC | Age: 68
End: 2023-02-09
Payer: MEDICARE

## 2023-02-09 VITALS — BODY MASS INDEX: 23.56 KG/M2 | HEIGHT: 60 IN | WEIGHT: 120 LBS

## 2023-02-09 DIAGNOSIS — M17.11 PRIMARY OSTEOARTHRITIS OF RIGHT KNEE: Primary | ICD-10-CM

## 2023-02-09 PROCEDURE — 99213 OFFICE O/P EST LOW 20 MIN: CPT | Performed by: PHYSICIAN ASSISTANT

## 2023-02-09 NOTE — PROGRESS NOTES
[facility] [place] [facility] [place] Visit          Patient: KRISTEN Mcdaniel  YOB: 1955  Date of Encounter: 2/9/2023  PCP: Ladi Rehman MD      Subjective     Chief Complaint   Patient presents with   • Right Knee - Follow-up, Pain           History of Present Illness:     KRISTEN Mcdaniel is a 67 y.o. female presents today presents as result of right knee pain/osteoarthritis.  Patient has done well with previous injection as well as medial  bracing and activity modification.  As result of this the patient would like to discuss the potential for viscosupplementation and other conservative treatment options to forego total knee arthroplasty.  Patient states that she is having some improvement and decrease findings with flexion and abduction of the knees.  Patient reports no other new complaints today. She continues to see greater than 85% of her symptoms.  Denies any paresthesias.           There is no problem list on file for this patient.    Past Medical History:   Diagnosis Date   • Osteoporosis    • Sleep apnea      History reviewed. No pertinent surgical history.  Social History     Occupational History   • Not on file   Tobacco Use   • Smoking status: Never   • Smokeless tobacco: Never   Vaping Use   • Vaping Use: Never used   Substance and Sexual Activity   • Alcohol use: Never   • Drug use: Never   • Sexual activity: Defer    KRISTEN Mcdaniel  reports that she has never smoked. She has never used smokeless tobacco.. I have educated her on the risk of diseases from using tobacco products such as cancer, COPD and heart disease.       Social History     Social History Narrative   • Not on file     Family History   Problem Relation Age of Onset   • Breast cancer Neg Hx      Current Outpatient Medications   Medication Sig Dispense Refill   • acetaminophen (TYLENOL) 650 MG 8 hr tablet Take 1 tablet by mouth Every 8 (Eight) Hours As Needed for Mild Pain.     • Diclofenac  "Sodium (VOLTAREN) 1 % gel gel APPLY 2 GRAMS BY TOPICAL ROUTE 2 TIMES PER DAY TOP THE AFFECTED AREA     • levothyroxine (SYNTHROID, LEVOTHROID) 25 MCG tablet Take 1 tablet by mouth Daily.     • naproxen sodium (ALEVE) 220 MG tablet Take 220 mg by mouth 2 (Two) Times a Day As Needed.     • pravastatin (PRAVACHOL) 40 MG tablet Take 1 tablet by mouth Daily.     • vitamin D (ERGOCALCIFEROL) 1.25 MG (79102 UT) capsule capsule Take 1 capsule by mouth 1 (One) Time Per Week.     • zolpidem (AMBIEN) 10 MG tablet Take 1 tablet by mouth every night at bedtime.     • HYDROcodone-acetaminophen (NORCO) 7.5-325 MG per tablet Take 1 tablet by mouth Every 6 (Six) Hours As Needed for Moderate Pain . 12 tablet 0     No current facility-administered medications for this visit.     No Known Allergies         Review of Systems   Constitutional: Negative.   HENT: Negative.    Eyes: Negative.    Cardiovascular: Negative.    Respiratory: Negative.    Endocrine: Negative.    Hematologic/Lymphatic: Negative.    Skin: Negative.    Musculoskeletal:        Pertinent positives listed in HPI   Gastrointestinal: Negative.    Genitourinary: Negative.    Neurological: Negative.    Psychiatric/Behavioral: Negative.    Allergic/Immunologic: Negative.          Objective      Vitals:    02/09/23 1014   Weight: 54.4 kg (120 lb)   Height: 152.4 cm (60\")      BMI is within normal parameters. No other follow-up for BMI required.      Physical Exam  Vitals and nursing note reviewed.   Constitutional:       General: She is not in acute distress.     Appearance: She is not ill-appearing.   HENT:      Head: Normocephalic and atraumatic.      Right Ear: External ear normal.      Left Ear: External ear normal.      Nose: Nose normal. No congestion or rhinorrhea.   Eyes:      Extraocular Movements: Extraocular movements intact.      Conjunctiva/sclera: Conjunctivae normal.      Pupils: Pupils are equal, round, and reactive to light.   Cardiovascular:      Rate and " Rhythm: Normal rate.      Pulses: Normal pulses.   Pulmonary:      Effort: Pulmonary effort is normal. No respiratory distress.      Breath sounds: No stridor.   Abdominal:      General: There is no distension.   Musculoskeletal:      Cervical back: Normal range of motion.      Comments: Right knee on examination today reveals obvious varus deformity with laxity upon valgus stress.  Patient exhibits full flexion and extension with scant effusion.  Patellofemoral crepitus present. Mild medial jointline tenderness. Lachman and drawer testing negative.  Bindu's and Apley's grind testing negative.  Neurovascular status grossly intact right lower extremity.    Skin:     General: Skin is warm and dry.      Capillary Refill: Capillary refill takes less than 2 seconds.   Neurological:      General: No focal deficit present.      Mental Status: She is alert and oriented to person, place, and time.   Psychiatric:         Mood and Affect: Mood normal.         Behavior: Behavior normal.         Thought Content: Thought content normal.         Judgment: Judgment normal.             Radiology:      XR Knee 3 View Right    Result Date: 11/11/2022    Arthritic change, but no acute bony abnormality.  This report was finalized on 11/11/2022 9:45 AM by Dr. Jayson Juárez MD.                Assessment/Plan        ICD-10-CM ICD-9-CM   1. Primary osteoarthritis of right knee  M17.11 715.16       67-year-old female with notable right knee osteoarthritis and tricompartmental changes with medial joint space collapse and laxity.  Patient continues to see improvement of pain and symptoms with most previous injection and conservative treatment options.  As result the patient will return back on an as-needed basis and we will work on precertification for viscosupplementation.  Depending on the length of efficacy we discussed the possibility of repeat steroid injection versus proceeding forward with viscosupplementation. We will continue to  exhaust all conservative treatment options before discussing total knee arthroplasty.  Patient is agreeable and knowing of current treatment plan of care.                This document was signed by Trevor Jones PA-C February 9 , 2023    CC: Ladi Rehman MD      Dictated Utilizing Dragon Dictation:   Please note that portions of this note were completed with a voice recognition program.   Part of this note may be an electronic transcription/translation of spoken language to printed text using the Dragon Dictation System.

## 2023-03-29 ENCOUNTER — HOSPITAL ENCOUNTER (OUTPATIENT)
Dept: MAMMOGRAPHY | Facility: HOSPITAL | Age: 68
Discharge: HOME | End: 2023-03-29
Admitting: INTERNAL MEDICINE
Payer: MEDICARE

## 2023-03-29 DIAGNOSIS — Z12.31 VISIT FOR SCREENING MAMMOGRAM: ICD-10-CM

## 2023-03-29 PROCEDURE — 77067 SCR MAMMO BI INCL CAD: CPT

## 2023-03-29 PROCEDURE — 77067 SCR MAMMO BI INCL CAD: CPT | Performed by: RADIOLOGY

## 2023-03-29 PROCEDURE — 77063 BREAST TOMOSYNTHESIS BI: CPT

## 2023-03-29 PROCEDURE — 77063 BREAST TOMOSYNTHESIS BI: CPT | Performed by: RADIOLOGY

## 2023-04-14 ENCOUNTER — TELEPHONE (OUTPATIENT)
Dept: ORTHOPEDIC SURGERY | Facility: CLINIC | Age: 68
End: 2023-04-14

## 2023-04-14 NOTE — TELEPHONE ENCOUNTER
Caller: KRISTEN Mcdaniel    Relationship to patient: Self    Best call back number: [phone]    Chief complaint: RIGHT KNEE PAIN    Type of visit: INJECTION    Requested date: ASAP      Additional notes: OKAY TO LVM

## 2023-04-18 ENCOUNTER — OFFICE VISIT (OUTPATIENT)
Dept: ORTHOPEDIC SURGERY | Facility: CLINIC | Age: 68
End: 2023-04-18
Payer: MEDICARE

## 2023-04-18 VITALS — WEIGHT: 119.93 LBS | BODY MASS INDEX: 23.55 KG/M2 | HEIGHT: 60 IN

## 2023-04-18 DIAGNOSIS — M17.11 PRIMARY OSTEOARTHRITIS OF RIGHT KNEE: Primary | ICD-10-CM

## 2023-04-18 RX ORDER — METHYLPREDNISOLONE ACETATE 80 MG/ML
80 INJECTION, SUSPENSION INTRA-ARTICULAR; INTRALESIONAL; INTRAMUSCULAR; SOFT TISSUE
Status: COMPLETED | OUTPATIENT
Start: 2023-04-18 | End: 2023-04-18

## 2023-04-18 RX ORDER — LIDOCAINE HYDROCHLORIDE 10 MG/ML
5 INJECTION, SOLUTION EPIDURAL; INFILTRATION; INTRACAUDAL; PERINEURAL
Status: COMPLETED | OUTPATIENT
Start: 2023-04-18 | End: 2023-04-18

## 2023-04-18 RX ADMIN — LIDOCAINE HYDROCHLORIDE 5 ML: 10 INJECTION, SOLUTION EPIDURAL; INFILTRATION; INTRACAUDAL; PERINEURAL at 15:33

## 2023-04-18 RX ADMIN — METHYLPREDNISOLONE ACETATE 80 MG: 80 INJECTION, SUSPENSION INTRA-ARTICULAR; INTRALESIONAL; INTRAMUSCULAR; SOFT TISSUE at 15:33

## 2023-04-18 NOTE — PROGRESS NOTES
[facility] [place] [facility] Established Patient Visit          Patient: KRISTEN Mcdaniel  YOB: 1955  Date of Encounter: 4/18/2023  PCP: Ladi eRhman MD      Subjective     Chief Complaint   Patient presents with   • Right Knee - Osteoarthritis, Follow-up     Monovisc injection right knee no auth needed           History of Present Illness:     KRISTEN Mcdaniel is a 67 y.o. female presents today presents as result of right knee pain/osteoarthritis.  Patient has done well with previous injection as well as medial  bracing and activity modification.  She has began to have recent exacerbation and return of medial knee pain and swelling and stiffness with difficulty upon prolonged standing or ambulation.  No other new complaints, she denies any paresthesias.           There is no problem list on file for this patient.    Past Medical History:   Diagnosis Date   • Osteoporosis    • Sleep apnea      History reviewed. No pertinent surgical history.  Social History     Occupational History   • Not on file   Tobacco Use   • Smoking status: Never   • Smokeless tobacco: Never   Vaping Use   • Vaping Use: Never used   Substance and Sexual Activity   • Alcohol use: Never   • Drug use: Never   • Sexual activity: Defer    KRISTEN Mcdaniel  reports that she has never smoked. She has never used smokeless tobacco.. I have educated her on the risk of diseases from using tobacco products such as cancer, COPD and heart disease.       Social History     Social History Narrative   • Not on file     Family History   Problem Relation Age of Onset   • Breast cancer Neg Hx      Current Outpatient Medications   Medication Sig Dispense Refill   • acetaminophen (TYLENOL) 650 MG 8 hr tablet Take 1 tablet by mouth Every 8 (Eight) Hours As Needed for Mild Pain.     • Diclofenac Sodium (VOLTAREN) 1 % gel gel APPLY 2 GRAMS BY TOPICAL ROUTE 2 TIMES PER DAY TOP THE AFFECTED AREA     • levothyroxine (SYNTHROID, LEVOTHROID) 25  "MCG tablet Take 1 tablet by mouth Daily.     • naproxen sodium (ALEVE) 220 MG tablet Take 1 tablet by mouth 2 (Two) Times a Day As Needed.     • pravastatin (PRAVACHOL) 40 MG tablet Take 1 tablet by mouth Daily.     • vitamin D (ERGOCALCIFEROL) 1.25 MG (43073 UT) capsule capsule Take 1 capsule by mouth 1 (One) Time Per Week.     • zolpidem (AMBIEN) 10 MG tablet Take 1 tablet by mouth every night at bedtime.     • HYDROcodone-acetaminophen (NORCO) 7.5-325 MG per tablet Take 1 tablet by mouth Every 6 (Six) Hours As Needed for Moderate Pain . 12 tablet 0     No current facility-administered medications for this visit.     No Known Allergies         Review of Systems   Constitutional: Negative.   HENT: Negative.    Eyes: Negative.    Cardiovascular: Negative.    Respiratory: Negative.    Endocrine: Negative.    Hematologic/Lymphatic: Negative.    Skin: Negative.    Musculoskeletal:        Pertinent positives listed in HPI   Gastrointestinal: Negative.    Genitourinary: Negative.    Neurological: Negative.    Psychiatric/Behavioral: Negative.    Allergic/Immunologic: Negative.          Objective      Vitals:    04/18/23 1505   Weight: 54.4 kg (119 lb 14.9 oz)   Height: 152.4 cm (60\")      BMI is within normal parameters. No other follow-up for BMI required.      Physical Exam  Vitals and nursing note reviewed.   Constitutional:       General: She is not in acute distress.     Appearance: She is not ill-appearing.   HENT:      Head: Normocephalic and atraumatic.      Right Ear: External ear normal.      Left Ear: External ear normal.      Nose: Nose normal. No congestion or rhinorrhea.   Eyes:      Extraocular Movements: Extraocular movements intact.      Conjunctiva/sclera: Conjunctivae normal.      Pupils: Pupils are equal, round, and reactive to light.   Cardiovascular:      Rate and Rhythm: Normal rate.      Pulses: Normal pulses.   Pulmonary:      Effort: Pulmonary effort is normal. No respiratory distress.      " Breath sounds: No stridor.   Abdominal:      General: There is no distension.   Musculoskeletal:      Cervical back: Normal range of motion.      Comments: Right knee on examination today reveals obvious varus deformity with laxity upon valgus stress.  Patient exhibits full flexion and extension with mild effusion.  Patellofemoral crepitus present. Mild medial jointline tenderness. Lachman and drawer testing negative.  Bindu's and Apley's grind testing negative.  Neurovascular status grossly intact right lower extremity.    Skin:     General: Skin is warm and dry.      Capillary Refill: Capillary refill takes less than 2 seconds.   Neurological:      General: No focal deficit present.      Mental Status: She is alert and oriented to person, place, and time.   Psychiatric:         Mood and Affect: Mood normal.         Behavior: Behavior normal.         Thought Content: Thought content normal.         Judgment: Judgment normal.                     Assessment/Plan        ICD-10-CM ICD-9-CM   1. Primary osteoarthritis of right knee  M17.11 715.16       67-year-old female with notable right knee osteoarthritis and tricompartmental degenerative changes medial joint space collapse and laxity.  The patient has seen improvement with previous injections and conservative treatment options.  As result of the patient's exacerbation and return of pain and symptoms the patient was provided today with a right knee aspiration yielding 15 cc of serous fluid and subsequently injected with 80 mg Depo-Medrol with lidocaine block and Monovisc.  Patient tolerated this procedure well.  She was instructed to return back in 4 weeks for further evaluation of the efficacy of conservative treatment. We will continue to exhaust all conservative treatment options before discussing total knee arthroplasty. Patient is agreeable and knowing of current treatment plan of care.    Large Joint Arthrocentesis: R knee  Date/Time: 4/18/2023 3:33  PM  Consent given by: patient  Site marked: site marked  Supporting Documentation  Indications: pain and joint swelling   Procedure Details  Location: knee - R knee  Needle size: 25 G  Approach: superior (superior lateral )  Medications administered: 80 mg methylPREDNISolone acetate 80 MG/ML; 5 mL lidocaine PF 1% 1 %; 88 mg Hyaluronan 88 MG/4ML  Aspirate amount: 15 mL  Aspirate: serous  Patient tolerance: patient tolerated the procedure well with no immediate complications                  This document was signed by Trevor Jones PA-C April 18, 2023    CC: Ladi Rehman MD      Dictated Utilizing Dragon Dictation:   Please note that portions of this note were completed with a voice recognition program.   Part of this note may be an electronic transcription/translation of spoken language to printed text using the Dragon Dictation System.

## 2023-05-16 ENCOUNTER — OFFICE VISIT (OUTPATIENT)
Dept: ORTHOPEDIC SURGERY | Facility: CLINIC | Age: 68
End: 2023-05-16

## 2023-05-16 VITALS — WEIGHT: 119 LBS | BODY MASS INDEX: 23.36 KG/M2 | HEIGHT: 60 IN

## 2023-05-16 DIAGNOSIS — M17.11 PRIMARY OSTEOARTHRITIS OF RIGHT KNEE: Primary | ICD-10-CM

## 2023-05-16 PROCEDURE — 99213 OFFICE O/P EST LOW 20 MIN: CPT | Performed by: PHYSICIAN ASSISTANT

## 2023-05-31 NOTE — PROGRESS NOTES
[facility] [place] Visit          Patient: KRISTEN Mcdaniel  YOB: 1955  Date of Encounter: 5/16/2023  PCP: Ladi Rehman MD      Subjective     Chief Complaint   Patient presents with   • Right Knee - Follow-up, Pain           History of Present Illness:     KRISTEN Mcdaniel is a 67 y.o. female presents today for evaluation right knee pain and osteoarthritis.  The patient received Monovisc at last office visit which she notices improvement.  Patient reports improving overall range of motion and denies any significant difficulty upon range of motion or weightbearing.  She states that the injection is definitely helping.  She reports no other new complaints.  Denies any paresthesias.           There is no problem list on file for this patient.    Past Medical History:   Diagnosis Date   • Osteoporosis    • Sleep apnea      History reviewed. No pertinent surgical history.  Social History     Occupational History   • Not on file   Tobacco Use   • Smoking status: Never   • Smokeless tobacco: Never   Vaping Use   • Vaping Use: Never used   Substance and Sexual Activity   • Alcohol use: Never   • Drug use: Never   • Sexual activity: Defer    KRISTEN Mcdaniel  reports that she has never smoked. She has never used smokeless tobacco.. I have educated her on the risk of diseases from using tobacco products such as cancer, COPD and heart disease.       Social History     Social History Narrative   • Not on file     Family History   Problem Relation Age of Onset   • Breast cancer Neg Hx      Current Outpatient Medications   Medication Sig Dispense Refill   • acetaminophen (TYLENOL) 650 MG 8 hr tablet Take 1 tablet by mouth Every 8 (Eight) Hours As Needed for Mild Pain.     • Diclofenac Sodium (VOLTAREN) 1 % gel gel APPLY 2 GRAMS BY TOPICAL ROUTE 2 TIMES PER DAY TOP THE AFFECTED AREA     • HYDROcodone-acetaminophen (NORCO) 7.5-325 MG per tablet Take 1 tablet by mouth Every 6 (Six)  "Hours As Needed for Moderate Pain . 12 tablet 0   • levothyroxine (SYNTHROID, LEVOTHROID) 25 MCG tablet Take 1 tablet by mouth Daily.     • naproxen sodium (ALEVE) 220 MG tablet Take 1 tablet by mouth 2 (Two) Times a Day As Needed.     • pravastatin (PRAVACHOL) 40 MG tablet Take 1 tablet by mouth Daily.     • vitamin D (ERGOCALCIFEROL) 1.25 MG (94173 UT) capsule capsule Take 1 capsule by mouth 1 (One) Time Per Week.     • zolpidem (AMBIEN) 10 MG tablet Take 1 tablet by mouth every night at bedtime.       No current facility-administered medications for this visit.     No Known Allergies         Review of Systems   Constitutional: Negative.   HENT: Negative.    Eyes: Negative.    Cardiovascular: Negative.    Respiratory: Negative.    Endocrine: Negative.    Hematologic/Lymphatic: Negative.    Skin: Negative.    Musculoskeletal:        Pertinent positives listed in HPI   Gastrointestinal: Negative.    Genitourinary: Negative.    Neurological: Negative.    Psychiatric/Behavioral: Negative.    Allergic/Immunologic: Negative.          Objective      Vitals:    05/16/23 0941   Weight: 54 kg (119 lb)   Height: 152.4 cm (60\")      BMI is within normal parameters. No other follow-up for BMI required.      Physical Exam  Vitals and nursing note reviewed.   Constitutional:       General: She is not in acute distress.     Appearance: She is not ill-appearing.   HENT:      Head: Normocephalic and atraumatic.      Right Ear: External ear normal.      Left Ear: External ear normal.      Nose: Nose normal. No congestion or rhinorrhea.   Eyes:      Extraocular Movements: Extraocular movements intact.      Conjunctiva/sclera: Conjunctivae normal.      Pupils: Pupils are equal, round, and reactive to light.   Cardiovascular:      Rate and Rhythm: Normal rate.      Pulses: Normal pulses.   Pulmonary:      Effort: Pulmonary effort is normal. No respiratory distress.      Breath sounds: No stridor.   Abdominal:      General: There is no " distension.   Musculoskeletal:      Cervical back: Normal range of motion.      Comments: Right knee on examination today reveals obvious varus deformity with laxity upon valgus stress.  Patient exhibits full flexion and extension with no effusion.  Patellofemoral crepitus present.  Reduction of previous medial jointline tenderness. Lachman and drawer testing negative.  Bindu's and Apley's grind testing negative. Neurovascular status grossly intact right lower extremity.    Skin:     General: Skin is warm and dry.      Capillary Refill: Capillary refill takes less than 2 seconds.   Neurological:      General: No focal deficit present.      Mental Status: She is alert and oriented to person, place, and time.   Psychiatric:         Mood and Affect: Mood normal.         Behavior: Behavior normal.         Thought Content: Thought content normal.         Judgment: Judgment normal.                     Assessment/Plan        ICD-10-CM ICD-9-CM   1. Primary osteoarthritis of right knee  M17.11 715.16     67-year-old female with notable right knee osteoarthritis and tricompartmental degenerative changes and medial joint space collapse/laxity.  The patient has seen improvement with previous conservative treatment options including previous aspiration and injection with intra-articular steroid.  Patient is also continue to see improvement with the most recent viscosupplementation Monovisc infusion.  The patient will continue with all other conservative treatment options and will return back on an as-needed basis upon any further complication or worsening of pain/symptoms.  Ultimately the patient would like to forego and prevent total knee arthroplasty at all cost.            This document was signed by Trevor Jones PA-C May 16, 2023    CC: Ladi Rehman MD      Dictated Utilizing Dragon Dictation:   Please note that portions of this note were completed with a voice recognition program.   Part of this note may be  an electronic transcription/translation of spoken language to printed text using the Dragon Dictation System.

## 2023-09-29 ENCOUNTER — TELEPHONE (OUTPATIENT)
Dept: ORTHOPEDIC SURGERY | Facility: CLINIC | Age: 68
End: 2023-09-29
Payer: MEDICARE

## 2023-09-29 NOTE — TELEPHONE ENCOUNTER
Caller: LUIS M SHAW    Relationship to patient: SELF    Best call back number: [phone]    Chief complaint: PATIENT REQUESTING GEL INJECTION IN RIGHT KNEE.    Type of visit: F/U/INJECTION

## 2023-10-04 ENCOUNTER — OFFICE VISIT (OUTPATIENT)
Dept: ORTHOPEDIC SURGERY | Facility: CLINIC | Age: 68
End: 2023-10-04
Payer: MEDICARE

## 2023-10-04 VITALS — HEIGHT: 60 IN | BODY MASS INDEX: 23.36 KG/M2 | WEIGHT: 119 LBS

## 2023-10-04 DIAGNOSIS — M17.11 PRIMARY OSTEOARTHRITIS OF RIGHT KNEE: Primary | ICD-10-CM

## 2023-10-04 RX ADMIN — METHYLPREDNISOLONE ACETATE 80 MG: 80 INJECTION, SUSPENSION INTRA-ARTICULAR; INTRALESIONAL; INTRAMUSCULAR; SOFT TISSUE at 16:49

## 2023-10-04 RX ADMIN — LIDOCAINE HYDROCHLORIDE 5 ML: 10 INJECTION, SOLUTION EPIDURAL; INFILTRATION; INTRACAUDAL; PERINEURAL at 16:49

## 2023-10-04 NOTE — PROGRESS NOTES
[facility] [place]          Patient: KRISTEN Mcdaniel  YOB: 1955  Date of Encounter: 10/4/2023  PCP: Ladi Rehman MD      Subjective     No chief complaint on file.          History of Present Illness:     RKISTEN Mcdaniel is a 68 y.o. female presents today for evaluation right knee pain and osteoarthritis.  The patient received Monovisc previously with notable improvement.  She has began to have return of pain symptoms and reports worsening right knee pain difficulty with range of motion and activity.  Patient declines any other new complaints. Denies any paresthesias.           There is no problem list on file for this patient.    Past Medical History:   Diagnosis Date    Osteoporosis     Sleep apnea      History reviewed. No pertinent surgical history.  Social History     Occupational History    Not on file   Tobacco Use    Smoking status: Never    Smokeless tobacco: Never   Vaping Use    Vaping Use: Never used   Substance and Sexual Activity    Alcohol use: Never    Drug use: Never    Sexual activity: Defer    KRISTEN Mcdaniel  reports that she has never smoked. She has never used smokeless tobacco.. I have educated her on the risk of diseases from using tobacco products such as cancer, COPD and heart disease.       Social History     Social History Narrative    Not on file     Family History   Problem Relation Age of Onset    Breast cancer Neg Hx      Current Outpatient Medications   Medication Sig Dispense Refill    acetaminophen (TYLENOL) 650 MG 8 hr tablet Take 1 tablet by mouth Every 8 (Eight) Hours As Needed for Mild Pain.      Diclofenac Sodium (VOLTAREN) 1 % gel gel APPLY 2 GRAMS BY TOPICAL ROUTE 2 TIMES PER DAY TOP THE AFFECTED AREA      levothyroxine (SYNTHROID, LEVOTHROID) 25 MCG tablet Take 1 tablet by mouth Daily.      naproxen sodium (ALEVE) 220 MG tablet Take 1 tablet by mouth 2 (Two) Times a Day As Needed.      pravastatin (PRAVACHOL) 40 MG  "tablet Take 1 tablet by mouth Daily.      vitamin D (ERGOCALCIFEROL) 1.25 MG (56149 UT) capsule capsule Take 1 capsule by mouth 1 (One) Time Per Week.      zolpidem (AMBIEN) 10 MG tablet Take 1 tablet by mouth every night at bedtime.      HYDROcodone-acetaminophen (NORCO) 7.5-325 MG per tablet Take 1 tablet by mouth Every 6 (Six) Hours As Needed for Moderate Pain . (Patient not taking: Reported on 10/4/2023) 12 tablet 0     No current facility-administered medications for this visit.     No Known Allergies         Review of Systems   Constitutional: Negative.   HENT: Negative.     Eyes: Negative.    Cardiovascular: Negative.    Respiratory: Negative.     Endocrine: Negative.    Hematologic/Lymphatic: Negative.    Skin: Negative.    Musculoskeletal:         Pertinent positives listed in HPI   Gastrointestinal: Negative.    Genitourinary: Negative.    Neurological: Negative.    Psychiatric/Behavioral: Negative.     Allergic/Immunologic: Negative.          Objective      Vitals:    10/04/23 1400   Weight: 54 kg (119 lb)   Height: 152.4 cm (60\")      BMI is within normal parameters. No other follow-up for BMI required.      Physical Exam  Vitals and nursing note reviewed.   Constitutional:       General: She is not in acute distress.     Appearance: She is not ill-appearing.   HENT:      Head: Normocephalic and atraumatic.      Right Ear: External ear normal.      Left Ear: External ear normal.      Nose: Nose normal. No congestion or rhinorrhea.   Eyes:      Extraocular Movements: Extraocular movements intact.      Conjunctiva/sclera: Conjunctivae normal.      Pupils: Pupils are equal, round, and reactive to light.   Cardiovascular:      Rate and Rhythm: Normal rate.      Pulses: Normal pulses.   Pulmonary:      Effort: Pulmonary effort is normal. No respiratory distress.      Breath sounds: No stridor.   Abdominal:      General: There is no distension.   Musculoskeletal:      Cervical back: Normal range of motion.     "  Comments: Right knee on examination today reveals obvious varus deformity with laxity upon valgus stress.  Patient exhibits full flexion and extension with no effusion.  Patellofemoral crepitus present.  Return of medial jointline tenderness. Lachman and drawer testing negative.  Bindu's and Apley's grind testing negative. Neurovascular status grossly intact right lower extremity.    Skin:     General: Skin is warm and dry.      Capillary Refill: Capillary refill takes less than 2 seconds.   Neurological:      General: No focal deficit present.      Mental Status: She is alert and oriented to person, place, and time.   Psychiatric:         Mood and Affect: Mood normal.         Behavior: Behavior normal.         Thought Content: Thought content normal.         Judgment: Judgment normal.                     Assessment/Plan        ICD-10-CM ICD-9-CM   1. Primary osteoarthritis of right knee  M17.11 715.16     68-year-old female with notable right knee osteoarthritis and tricompartmental degenerative changes of medial joint space collapse and laxity.  The patient has responded well in the past with viscosupplementation and bracing with the most recent return of pain symptoms.  As result the patient has not yet 6 months status post the Monovisc and is not a candidate for repeat infusion of this.  As result the patient was provided today with intra-articular steroid injection 80 mg Depo-Medrol lidocaine block injected into the intra-articular space of the right knee.  Patient tolerated procedure well.  Patient was instructed to return back to status post 10/19 in which we discussed possibility of implementing Monovisc.      Large Joint Arthrocentesis: R knee  Date/Time: 10/4/2023 4:49 PM  Consent given by: patient  Site marked: site marked  Supporting Documentation  Indications: pain and diagnostic evaluation   Procedure Details  Location: knee - R knee  Needle size: 25 G  Approach: anterolateral  Medications  administered: 80 mg methylPREDNISolone acetate 80 MG/ML; 5 mL lidocaine PF 1% 1 %  Patient tolerance: patient tolerated the procedure well with no immediate complications                This document was signed by Trevor Jones PA-C October 4, 2023    CC: Ladi Rehman MD      Dictated Utilizing Dragon Dictation:   Please note that portions of this note were completed with a voice recognition program.   Part of this note may be an electronic transcription/translation of spoken language to printed text using the Dragon Dictation System.

## 2023-10-18 RX ORDER — LIDOCAINE HYDROCHLORIDE 10 MG/ML
5 INJECTION, SOLUTION EPIDURAL; INFILTRATION; INTRACAUDAL; PERINEURAL
Status: COMPLETED | OUTPATIENT
Start: 2023-10-04 | End: 2023-10-04

## 2023-10-18 RX ORDER — METHYLPREDNISOLONE ACETATE 80 MG/ML
80 INJECTION, SUSPENSION INTRA-ARTICULAR; INTRALESIONAL; INTRAMUSCULAR; SOFT TISSUE
Status: COMPLETED | OUTPATIENT
Start: 2023-10-04 | End: 2023-10-04

## 2023-11-02 ENCOUNTER — OFFICE VISIT (OUTPATIENT)
Dept: ORTHOPEDIC SURGERY | Facility: CLINIC | Age: 68
End: 2023-11-02
Payer: MEDICARE

## 2023-11-02 VITALS — BODY MASS INDEX: 23.37 KG/M2 | HEIGHT: 60 IN | WEIGHT: 119.05 LBS

## 2023-11-02 DIAGNOSIS — M17.11 PRIMARY OSTEOARTHRITIS OF RIGHT KNEE: Primary | ICD-10-CM

## 2023-11-02 RX ORDER — LIDOCAINE HYDROCHLORIDE 10 MG/ML
5 INJECTION, SOLUTION EPIDURAL; INFILTRATION; INTRACAUDAL; PERINEURAL
Status: COMPLETED | OUTPATIENT
Start: 2023-11-02 | End: 2023-11-02

## 2023-11-02 RX ADMIN — LIDOCAINE HYDROCHLORIDE 5 ML: 10 INJECTION, SOLUTION EPIDURAL; INFILTRATION; INTRACAUDAL; PERINEURAL at 10:35

## 2023-11-02 NOTE — PROGRESS NOTES
[facility] – [facility] Established Patient Visit          Patient: KRISTEN Mcdaniel  YOB: 1955  Date of Encounter: 10/4/2023  PCP: Ladi Rehman MD      Subjective     Chief Complaint   Patient presents with    Right Knee - Osteoarthritis, Follow-up           History of Present Illness:     KRISTEN Mcdaniel is a 68 y.o. female presents today for evaluation right knee pain and osteoarthritis. The patient received Monovisc previously with notable improvement.  She has began to have return of pain symptoms and reports improvement of pain symptoms with the most recent intra-articular steroid injection provided last office visit.  Patient has had reduction of pain and swelling and stiffness.  She is questions in regards to continuation of viscosupplementation.  She reports no other new complaints.  Denies any paresthesias.       There is no problem list on file for this patient.    Past Medical History:   Diagnosis Date    Osteoporosis     Sleep apnea      History reviewed. No pertinent surgical history.  Social History     Occupational History    Not on file   Tobacco Use    Smoking status: Never    Smokeless tobacco: Never   Vaping Use    Vaping Use: Never used   Substance and Sexual Activity    Alcohol use: Never    Drug use: Never    Sexual activity: Defer    KRISTEN Mcdaniel  reports that she has never smoked. She has never used smokeless tobacco.. I have educated her on the risk of diseases from using tobacco products such as cancer, COPD and heart disease.       Social History     Social History Narrative    Not on file     Family History   Problem Relation Age of Onset    Breast cancer Neg Hx      Current Outpatient Medications   Medication Sig Dispense Refill    acetaminophen (TYLENOL) 650 MG 8 hr tablet Take 1 tablet by mouth Every 8 (Eight) Hours As Needed for Mild Pain.      Diclofenac Sodium (VOLTAREN) 1 % gel gel APPLY 2 GRAMS BY TOPICAL ROUTE 2 TIMES PER DAY TOP THE AFFECTED AREA    "   HYDROcodone-acetaminophen (NORCO) 7.5-325 MG per tablet Take 1 tablet by mouth Every 6 (Six) Hours As Needed for Moderate Pain . 12 tablet 0    levothyroxine (SYNTHROID, LEVOTHROID) 25 MCG tablet Take 1 tablet by mouth Daily.      naproxen sodium (ALEVE) 220 MG tablet Take 1 tablet by mouth 2 (Two) Times a Day As Needed.      pravastatin (PRAVACHOL) 40 MG tablet Take 1 tablet by mouth Daily.      vitamin D (ERGOCALCIFEROL) 1.25 MG (17468 UT) capsule capsule Take 1 capsule by mouth 1 (One) Time Per Week.      zolpidem (AMBIEN) 10 MG tablet Take 1 tablet by mouth every night at bedtime.       No current facility-administered medications for this visit.     No Known Allergies         Review of Systems   Constitutional: Negative.   HENT: Negative.     Eyes: Negative.    Cardiovascular: Negative.    Respiratory: Negative.     Endocrine: Negative.    Hematologic/Lymphatic: Negative.    Skin: Negative.    Musculoskeletal:         Pertinent positives listed in HPI   Gastrointestinal: Negative.    Genitourinary: Negative.    Neurological: Negative.    Psychiatric/Behavioral: Negative.     Allergic/Immunologic: Negative.          Objective      Vitals:    11/02/23 0933   Weight: 54 kg (119 lb 0.8 oz)   Height: 152.4 cm (60\")      BMI is within normal parameters. No other follow-up for BMI required.      Physical Exam  Vitals and nursing note reviewed.   Constitutional:       General: She is not in acute distress.     Appearance: She is not ill-appearing.   HENT:      Head: Normocephalic and atraumatic.      Right Ear: External ear normal.      Left Ear: External ear normal.      Nose: Nose normal. No congestion or rhinorrhea.   Eyes:      Extraocular Movements: Extraocular movements intact.      Conjunctiva/sclera: Conjunctivae normal.      Pupils: Pupils are equal, round, and reactive to light.   Cardiovascular:      Rate and Rhythm: Normal rate.      Pulses: Normal pulses.   Pulmonary:      Effort: Pulmonary effort is " normal. No respiratory distress.      Breath sounds: No stridor.   Abdominal:      General: There is no distension.   Musculoskeletal:      Cervical back: Normal range of motion.      Comments: Right knee on examination today reveals obvious varus deformity with laxity upon valgus stress.  Patient exhibits full flexion and extension with no effusion.  Patellofemoral crepitus present.  Reduction of previous medial jointline tenderness. Lachman and drawer testing negative.  Bindu's and Apley's grind testing negative. Neurovascular status grossly intact right lower extremity.    Skin:     General: Skin is warm and dry.      Capillary Refill: Capillary refill takes less than 2 seconds.   Neurological:      General: No focal deficit present.      Mental Status: She is alert and oriented to person, place, and time.   Psychiatric:         Mood and Affect: Mood normal.         Behavior: Behavior normal.         Thought Content: Thought content normal.         Judgment: Judgment normal.             Assessment/Plan        ICD-10-CM ICD-9-CM   1. Primary osteoarthritis of right knee  M17.11 715.16     68-year-old female with notable right knee osteoarthritis and tricompartmental degenerative changes.  The patient has medial joint space collapse and laxity as well.  The patient has undergone previous viscosupplementation as well as intra-articular steroid injection with alleviation of her pain symptoms previously.  Last office visit she received intra-articular steroid injection.  Today as result the patient was provided with an intra-articular Monovisc injection with lidocaine block injected into the intra-articular space of the right knee.  Patient tolerated this procedure well.  She was instructed to return back in 4 weeks for further evaluation.  We discussed potential for  bracing and other conservative treatment modalities in the future to prevent total knee arthroplasty.  The patient is agreeable to current  treatment plan of care.        Large Joint Arthrocentesis: R knee  Date/Time: 11/2/2023 10:35 AM  Consent given by: patient  Site marked: site marked  Timeout: Immediately prior to procedure a time out was called to verify the correct patient, procedure, equipment, support staff and site/side marked as required   Supporting Documentation  Indications: pain and diagnostic evaluation   Procedure Details  Location: knee - R knee  Needle size: 20 G  Approach: lateral  Medications administered: 5 mL lidocaine PF 1% 1 %; 88 mg Hyaluronan 88 MG/4ML  Patient tolerance: patient tolerated the procedure well with no immediate complications                This document was signed by Trevor Jones PA-C November 2, 2023    CC: Ladi Rehman MD      Dictated Utilizing Dragon Dictation:   Please note that portions of this note were completed with a voice recognition program.   Part of this note may be an electronic transcription/translation of spoken language to printed text using the Dragon Dictation System.

## 2023-11-30 ENCOUNTER — OFFICE VISIT (OUTPATIENT)
Dept: ORTHOPEDIC SURGERY | Facility: CLINIC | Age: 68
End: 2023-11-30
Payer: MEDICARE

## 2023-11-30 VITALS — HEIGHT: 60 IN | WEIGHT: 119 LBS | BODY MASS INDEX: 23.36 KG/M2

## 2023-11-30 DIAGNOSIS — M17.11 PRIMARY OSTEOARTHRITIS OF RIGHT KNEE: Primary | ICD-10-CM

## 2023-11-30 PROCEDURE — 99213 OFFICE O/P EST LOW 20 MIN: CPT | Performed by: PHYSICIAN ASSISTANT

## 2023-11-30 NOTE — PROGRESS NOTES
[facility] [place] Orthopaedic Surgery Established Patient Visit          Patient: KRISTEN Mcdaniel  YOB: 1955  Date of Encounter: 11/30/2023  PCP: Ladi Rehman MD      Subjective     Chief Complaint   Patient presents with    Right Knee - Follow-up           History of Present Illness:     KRISTEN Mcdaniel is a 68 y.o. female presents today for evaluation right knee pain and osteoarthritis. The patient received Monovisc at last office visit with notable alleviation of pain symptoms.  Patient reports no difficulties and increased overall range of motion and activity.  Patient states she is relatively pain-free with no complications.  Denies any paresthesias.      There is no problem list on file for this patient.    Past Medical History:   Diagnosis Date    Osteoporosis     Sleep apnea      History reviewed. No pertinent surgical history.  Social History     Occupational History    Not on file   Tobacco Use    Smoking status: Never    Smokeless tobacco: Never   Vaping Use    Vaping Use: Never used   Substance and Sexual Activity    Alcohol use: Never    Drug use: Never    Sexual activity: Defer    KRISTEN Mcdaniel  reports that she has never smoked. She has never used smokeless tobacco.. I have educated her on the risk of diseases from using tobacco products such as cancer, COPD and heart disease.       Social History     Social History Narrative    Not on file     Family History   Problem Relation Age of Onset    Breast cancer Neg Hx      Current Outpatient Medications   Medication Sig Dispense Refill    acetaminophen (TYLENOL) 650 MG 8 hr tablet Take 1 tablet by mouth Every 8 (Eight) Hours As Needed for Mild Pain.      Diclofenac Sodium (VOLTAREN) 1 % gel gel APPLY 2 GRAMS BY TOPICAL ROUTE 2 TIMES PER DAY TOP THE AFFECTED AREA      HYDROcodone-acetaminophen (NORCO) 7.5-325 MG per tablet Take 1 tablet by mouth Every 6 (Six) Hours As Needed for Moderate Pain . 12 tablet 0    levothyroxine (SYNTHROID,  "LEVOTHROID) 25 MCG tablet Take 1 tablet by mouth Daily.      naproxen sodium (ALEVE) 220 MG tablet Take 1 tablet by mouth 2 (Two) Times a Day As Needed.      pravastatin (PRAVACHOL) 40 MG tablet Take 1 tablet by mouth Daily.      vitamin D (ERGOCALCIFEROL) 1.25 MG (90144 UT) capsule capsule Take 1 capsule by mouth 1 (One) Time Per Week.      zolpidem (AMBIEN) 10 MG tablet Take 1 tablet by mouth every night at bedtime.       No current facility-administered medications for this visit.     No Known Allergies         Review of Systems   Constitutional: Negative.   HENT: Negative.     Eyes: Negative.    Cardiovascular: Negative.    Respiratory: Negative.     Endocrine: Negative.    Hematologic/Lymphatic: Negative.    Skin: Negative.    Musculoskeletal:         Pertinent positives listed in HPI   Gastrointestinal: Negative.    Genitourinary: Negative.    Neurological: Negative.    Psychiatric/Behavioral: Negative.     Allergic/Immunologic: Negative.          Objective      Vitals:    11/30/23 0923   Weight: 54 kg (119 lb)   Height: 152.4 cm (60\")      BMI is within normal parameters. No other follow-up for BMI required.      Physical Exam  Vitals and nursing note reviewed.   Constitutional:       General: She is not in acute distress.     Appearance: She is not ill-appearing.   HENT:      Head: Normocephalic and atraumatic.      Right Ear: External ear normal.      Left Ear: External ear normal.      Nose: Nose normal. No congestion or rhinorrhea.   Eyes:      Extraocular Movements: Extraocular movements intact.      Conjunctiva/sclera: Conjunctivae normal.      Pupils: Pupils are equal, round, and reactive to light.   Cardiovascular:      Rate and Rhythm: Normal rate.      Pulses: Normal pulses.   Pulmonary:      Effort: Pulmonary effort is normal. No respiratory distress.      Breath sounds: No stridor.   Abdominal:      General: There is no distension.   Musculoskeletal:      Cervical back: Normal range of motion.     "  Comments: Right knee on examination today reveals obvious varus deformity with laxity upon valgus stress.  Patient exhibits full flexion and extension with no effusion.  Patellofemoral crepitus present.  Reduction of previous medial jointline tenderness. Lachman and drawer testing negative.  Bindu's and Apley's grind testing negative. Neurovascular status grossly intact right lower extremity.    Skin:     General: Skin is warm and dry.      Capillary Refill: Capillary refill takes less than 2 seconds.   Neurological:      General: No focal deficit present.      Mental Status: She is alert and oriented to person, place, and time.   Psychiatric:         Mood and Affect: Mood normal.         Behavior: Behavior normal.         Thought Content: Thought content normal.         Judgment: Judgment normal.             Assessment/Plan        ICD-10-CM ICD-9-CM   1. Primary osteoarthritis of right knee  M17.11 715.16     68-year-old female with notable right knee osteoarthritis and tricompartmental degenerative changes.  The patient has responded well with intra-articular steroid injection and most recently viscosupplementation Monovisc.  She attributes alleviation of pain symptoms to which she is being much more active.  There is no other complications and as result today the patient was instructed to return back on an as-needed basis upon any further complication. We discussed potential for  bracing and other conservative treatment modalities in the future to prevent total knee arthroplasty.  The patient is agreeable to current treatment plan of care.                  This document was signed by Trevor Jones PA-C November 30, 2023    CC: Ladi Rehman MD      Dictated Utilizing Dragon Dictation:   Please note that portions of this note were completed with a voice recognition program.   Part of this note may be an electronic transcription/translation of spoken language to printed text using the Dragon  Dictation System.

## 2024-05-21 ENCOUNTER — TELEPHONE (OUTPATIENT)
Dept: ORTHOPEDIC SURGERY | Facility: CLINIC | Age: 69
End: 2024-05-21
Payer: MEDICARE

## 2024-05-21 NOTE — TELEPHONE ENCOUNTER
Provider: MANUEL MESSER    Caller: KRISTEN SHAW    Relationship to Patient: SELF    Phone Number: [phone]    Reason for Call: PATIENT CALLED WANTING TO KNOW IF MANUEL MESSER RECOMMENDS A VISCO OR CORTISONE INJECTION FOR THE RIGHT KNEE. REQUESTING A CALL BACK ON SCHEDULING. PLEASE ADVISE.

## 2024-06-04 ENCOUNTER — OFFICE VISIT (OUTPATIENT)
Dept: ORTHOPEDIC SURGERY | Facility: CLINIC | Age: 69
End: 2024-06-04
Payer: MEDICARE

## 2024-06-04 VITALS — BODY MASS INDEX: 23.36 KG/M2 | HEIGHT: 60 IN | WEIGHT: 119 LBS

## 2024-06-04 DIAGNOSIS — M17.11 PRIMARY OSTEOARTHRITIS OF RIGHT KNEE: Primary | ICD-10-CM

## 2024-06-04 PROCEDURE — 99213 OFFICE O/P EST LOW 20 MIN: CPT | Performed by: PHYSICIAN ASSISTANT

## 2024-06-04 PROCEDURE — 20610 DRAIN/INJ JOINT/BURSA W/O US: CPT | Performed by: PHYSICIAN ASSISTANT

## 2024-06-04 RX ORDER — LIDOCAINE HYDROCHLORIDE 10 MG/ML
5 INJECTION, SOLUTION EPIDURAL; INFILTRATION; INTRACAUDAL; PERINEURAL
Status: COMPLETED | OUTPATIENT
Start: 2024-06-04 | End: 2024-06-04

## 2024-06-04 RX ADMIN — LIDOCAINE HYDROCHLORIDE 5 ML: 10 INJECTION, SOLUTION EPIDURAL; INFILTRATION; INTRACAUDAL; PERINEURAL at 10:50

## 2024-06-04 NOTE — PROGRESS NOTES
[facility] [place] Orthopaedic Surgery Established Patient Visit          Patient: KRISTEN Mcdaniel  YOB: 1955  Date of Encounter: 6/4/2024  PCP: Ladi Rehman MD      Subjective     Chief Complaint   Patient presents with    Right Knee - Follow-up, Pain           History of Present Illness:     KRISTEN Mcdaniel is a 68 y.o. female presents today for evaluation right knee pain and osteoarthritis. The patient received Monovisc previous last office visit with notable alleviation of pain symptoms.  Patient reports mild return of pain symptoms over the last month.  The patient is greater than 6 months status post previous viscosupplementation.  She has questions in regards to repeat infusion today and further conservative treatment options.  Denies any paresthesias.      There is no problem list on file for this patient.    Past Medical History:   Diagnosis Date    Osteoporosis     Sleep apnea      History reviewed. No pertinent surgical history.  Social History     Occupational History    Not on file   Tobacco Use    Smoking status: Never    Smokeless tobacco: Never   Vaping Use    Vaping status: Never Used   Substance and Sexual Activity    Alcohol use: Never    Drug use: Never    Sexual activity: Defer    KRISTEN Mcdaniel  reports that she has never smoked. She has never used smokeless tobacco.. I have educated her on the risk of diseases from using tobacco products such as cancer, COPD and heart disease.       Social History     Social History Narrative    Not on file     Family History   Problem Relation Age of Onset    Breast cancer Neg Hx      Current Outpatient Medications   Medication Sig Dispense Refill    acetaminophen (TYLENOL) 650 MG 8 hr tablet Take 1 tablet by mouth Every 8 (Eight) Hours As Needed for Mild Pain.      Diclofenac Sodium (VOLTAREN) 1 % gel gel APPLY 2 GRAMS BY TOPICAL ROUTE 2 TIMES PER DAY TOP THE AFFECTED AREA      HYDROcodone-acetaminophen (NORCO) 7.5-325 MG per tablet Take 1  "tablet by mouth Every 6 (Six) Hours As Needed for Moderate Pain . 12 tablet 0    levothyroxine (SYNTHROID, LEVOTHROID) 25 MCG tablet Take 1 tablet by mouth Daily.      naproxen sodium (ALEVE) 220 MG tablet Take 1 tablet by mouth 2 (Two) Times a Day As Needed.      pravastatin (PRAVACHOL) 40 MG tablet Take 1 tablet by mouth Daily.      vitamin D (ERGOCALCIFEROL) 1.25 MG (71888 UT) capsule capsule Take 1 capsule by mouth 1 (One) Time Per Week.      zolpidem (AMBIEN) 10 MG tablet Take 1 tablet by mouth every night at bedtime.       No current facility-administered medications for this visit.     No Known Allergies       Review of Systems   Constitutional: Negative.   HENT: Negative.     Eyes: Negative.    Cardiovascular: Negative.    Respiratory: Negative.     Endocrine: Negative.    Hematologic/Lymphatic: Negative.    Skin: Negative.    Musculoskeletal:         Pertinent positives listed in HPI   Gastrointestinal: Negative.    Genitourinary: Negative.    Neurological: Negative.    Psychiatric/Behavioral: Negative.     Allergic/Immunologic: Negative.          Objective      Vitals:    06/04/24 0923   Weight: 54 kg (119 lb)   Height: 152.4 cm (60\")     BMI is within normal parameters. No other follow-up for BMI required.      Physical Exam  Vitals and nursing note reviewed.   Constitutional:       General: She is not in acute distress.     Appearance: She is not ill-appearing.   HENT:      Head: Normocephalic and atraumatic.      Right Ear: External ear normal.      Left Ear: External ear normal.      Nose: Nose normal. No congestion or rhinorrhea.   Eyes:      Extraocular Movements: Extraocular movements intact.      Conjunctiva/sclera: Conjunctivae normal.      Pupils: Pupils are equal, round, and reactive to light.   Cardiovascular:      Rate and Rhythm: Normal rate.      Pulses: Normal pulses.   Pulmonary:      Effort: Pulmonary effort is normal. No respiratory distress.      Breath sounds: No stridor.   Abdominal: "      General: There is no distension.   Musculoskeletal:      Cervical back: Normal range of motion.      Comments: Right knee on examination today reveals obvious varus deformity with laxity upon valgus stress.  Patient exhibits full flexion and extension with no effusion.  Patellofemoral crepitus present.  Return of previous medial jointline tenderness. Lachman and drawer testing negative.  Bindu's and Apley's grind testing negative. Neurovascular status grossly intact right lower extremity.    Skin:     General: Skin is warm and dry.      Capillary Refill: Capillary refill takes less than 2 seconds.   Neurological:      General: No focal deficit present.      Mental Status: She is alert and oriented to person, place, and time.   Psychiatric:         Mood and Affect: Mood normal.         Behavior: Behavior normal.         Thought Content: Thought content normal.         Judgment: Judgment normal.             Assessment/Plan        ICD-10-CM ICD-9-CM   1. Primary osteoarthritis of right knee  M17.11 715.16     68-year-old female with notable right knee osteoarthritis and tricompartmental degenerative changes.  The patient has responded well in the past to intra-articular steroid injections as well as previous viscosupplementation with Monovisc.  She has began to have return of pain and symptoms and she is greater than 6 months status post previous viscosupplementation.  As result of this the patient was provided with Monovisc with lidocaine block injected into the intra-articular space of the right knee.  she tolerated this procedure well.  She was instructed to return back in 4 weeks for further evaluation of the efficacy of the conservative treatment.  We did discuss the potential for  bracing and continuation of conservative treatment options in an effort to prevent and avoid total knee arthroplasty.  Patient remains agreeable to current treatment plan of care.        Large Joint Arthrocentesis: R  knee  Date/Time: 6/4/2024 10:50 AM  Consent given by: patient  Site marked: site marked  Timeout: Immediately prior to procedure a time out was called to verify the correct patient, procedure, equipment, support staff and site/side marked as required   Supporting Documentation  Indications: pain and diagnostic evaluation   Procedure Details  Location: knee - R knee  Needle size: 25 G  Approach: lateral  Medications administered: 5 mL lidocaine PF 1% 1 %; 88 mg Hyaluronan 88 MG/4ML  Patient tolerance: patient tolerated the procedure well with no immediate complications                    This document was signed by Trevor Jones PA-C June 4, 2024    CC: Ladi Rehman MD      Dictated Utilizing Dragon Dictation:   Please note that portions of this note were completed with a voice recognition program.   Part of this note may be an electronic transcription/translation of spoken language to printed text using the Dragon Dictation System.

## 2024-07-25 ENCOUNTER — TRANSCRIBE ORDERS (OUTPATIENT)
Dept: ADMINISTRATIVE | Facility: HOSPITAL | Age: 69
End: 2024-07-25
Payer: MEDICARE

## 2024-07-25 DIAGNOSIS — Z12.31 BREAST CANCER SCREENING BY MAMMOGRAM: Primary | ICD-10-CM

## 2024-07-31 ENCOUNTER — HOSPITAL ENCOUNTER (OUTPATIENT)
Dept: MAMMOGRAPHY | Facility: HOSPITAL | Age: 69
Discharge: HOME | End: 2024-07-31
Admitting: INTERNAL MEDICINE
Payer: MEDICARE

## 2024-07-31 DIAGNOSIS — Z12.31 BREAST CANCER SCREENING BY MAMMOGRAM: ICD-10-CM

## 2024-07-31 PROCEDURE — 77063 BREAST TOMOSYNTHESIS BI: CPT | Performed by: RADIOLOGY

## 2024-07-31 PROCEDURE — 77067 SCR MAMMO BI INCL CAD: CPT

## 2024-07-31 PROCEDURE — 77063 BREAST TOMOSYNTHESIS BI: CPT

## 2024-07-31 PROCEDURE — 77067 SCR MAMMO BI INCL CAD: CPT | Performed by: RADIOLOGY

## 2025-01-16 ENCOUNTER — OFFICE VISIT (OUTPATIENT)
Dept: ORTHOPEDIC SURGERY | Facility: CLINIC | Age: 70
End: 2025-01-16
Payer: MEDICARE

## 2025-01-16 VITALS — HEIGHT: 60 IN | WEIGHT: 119 LBS | BODY MASS INDEX: 23.36 KG/M2

## 2025-01-16 DIAGNOSIS — M17.11 PRIMARY OSTEOARTHRITIS OF RIGHT KNEE: Primary | ICD-10-CM

## 2025-01-16 RX ORDER — MELOXICAM 7.5 MG/1
7.5 TABLET ORAL DAILY
COMMUNITY

## 2025-01-16 RX ADMIN — METHYLPREDNISOLONE ACETATE 80 MG: 80 INJECTION, SUSPENSION INTRA-ARTICULAR; INTRALESIONAL; INTRAMUSCULAR; SOFT TISSUE at 10:41

## 2025-01-16 RX ADMIN — LIDOCAINE HYDROCHLORIDE 5 ML: 10 INJECTION, SOLUTION EPIDURAL; INFILTRATION; INTRACAUDAL; PERINEURAL at 10:41

## 2025-01-16 NOTE — PROGRESS NOTES
[facility] [place] Orthopaedic Surgery Established Patient Visit          Patient: KRISTEN Mcdaniel  YOB: 1955  Date of Encounter: 1/16/2025  PCP: Ladi Rehman MD      Subjective     Chief Complaint   Patient presents with    Right Knee - Follow-up, Pain           History of Present Illness:     KRISTEN Mcdaniel is a 69 y.o. female presents today for evaluation right knee pain and osteoarthritis. The patient received Monovisc previously with notable alleviation of pain symptoms.  Patient reports mild return of pain symptoms over the last month.  The patient is greater than 6 months status post previous viscosupplementation.  She has questions in regards to repeat infusion today and further conservative treatment options.  Denies any paresthesias.      There is no problem list on file for this patient.    Past Medical History:   Diagnosis Date    Osteoporosis     Sleep apnea      History reviewed. No pertinent surgical history.  Social History     Occupational History    Not on file   Tobacco Use    Smoking status: Never    Smokeless tobacco: Never   Vaping Use    Vaping status: Never Used   Substance and Sexual Activity    Alcohol use: Never    Drug use: Never    Sexual activity: Defer    KRISTEN Mcdaniel  reports that she has never smoked. She has never used smokeless tobacco.. I have educated her on the risk of diseases from using tobacco products such as cancer, COPD and heart disease.       Social History     Social History Narrative    Not on file     Family History   Problem Relation Age of Onset    Breast cancer Neg Hx      Current Outpatient Medications   Medication Sig Dispense Refill    acetaminophen (TYLENOL) 650 MG 8 hr tablet Take 1 tablet by mouth Every 8 (Eight) Hours As Needed for Mild Pain.      Diclofenac Sodium (VOLTAREN) 1 % gel gel APPLY 2 GRAMS BY TOPICAL ROUTE 2 TIMES PER DAY TOP THE AFFECTED AREA      HYDROcodone-acetaminophen (NORCO) 7.5-325 MG per tablet Take 1 tablet by mouth  "Every 6 (Six) Hours As Needed for Moderate Pain . 12 tablet 0    levothyroxine (SYNTHROID, LEVOTHROID) 25 MCG tablet Take 1 tablet by mouth Daily.      meloxicam (MOBIC) 7.5 MG tablet Take 1 tablet by mouth Daily.      pravastatin (PRAVACHOL) 40 MG tablet Take 1 tablet by mouth Daily.      vitamin D (ERGOCALCIFEROL) 1.25 MG (62887 UT) capsule capsule Take 1 capsule by mouth 1 (One) Time Per Week.      zolpidem (AMBIEN) 10 MG tablet Take 1 tablet by mouth every night at bedtime.      naproxen sodium (ALEVE) 220 MG tablet Take 1 tablet by mouth 2 (Two) Times a Day As Needed. (Patient not taking: Reported on 1/16/2025)       No current facility-administered medications for this visit.     No Known Allergies       Review of Systems   Constitutional: Negative.   HENT: Negative.     Eyes: Negative.    Cardiovascular: Negative.    Respiratory: Negative.     Endocrine: Negative.    Hematologic/Lymphatic: Negative.    Skin: Negative.    Musculoskeletal:         Pertinent positives listed in HPI   Gastrointestinal: Negative.    Genitourinary: Negative.    Neurological: Negative.    Psychiatric/Behavioral: Negative.     Allergic/Immunologic: Negative.          Objective      Vitals:    01/16/25 1006   Weight: 54 kg (119 lb)   Height: 152.4 cm (60\")     BMI is within normal parameters. No other follow-up for BMI required.      Physical Exam  Vitals and nursing note reviewed.   Constitutional:       General: She is not in acute distress.     Appearance: She is not ill-appearing.   HENT:      Head: Normocephalic and atraumatic.      Right Ear: External ear normal.      Left Ear: External ear normal.      Nose: Nose normal. No congestion or rhinorrhea.   Eyes:      Extraocular Movements: Extraocular movements intact.      Conjunctiva/sclera: Conjunctivae normal.      Pupils: Pupils are equal, round, and reactive to light.   Cardiovascular:      Rate and Rhythm: Normal rate.      Pulses: Normal pulses.   Pulmonary:      Effort: " Pulmonary effort is normal. No respiratory distress.      Breath sounds: No stridor.   Abdominal:      General: There is no distension.   Musculoskeletal:      Cervical back: Normal range of motion.      Comments: Right knee on examination today reveals obvious varus deformity with laxity upon valgus stress.  Patient exhibits full flexion and extension with no effusion.  Patellofemoral crepitus present.  Return of previous medial jointline tenderness. Lachman and drawer testing negative.  Bindu's and Apley's grind testing negative. Neurovascular status grossly intact right lower extremity.    Skin:     General: Skin is warm and dry.      Capillary Refill: Capillary refill takes less than 2 seconds.   Neurological:      General: No focal deficit present.      Mental Status: She is alert and oriented to person, place, and time.   Psychiatric:         Mood and Affect: Mood normal.         Behavior: Behavior normal.         Thought Content: Thought content normal.         Judgment: Judgment normal.             Assessment/Plan        ICD-10-CM ICD-9-CM   1. Primary osteoarthritis of right knee  M17.11 715.16       68-year-old female with notable right knee osteoarthritis and tricompartmental degenerative changes.  The patient has responded well in the past to intra-articular steroid injections as well as previous viscosupplementation with Monovisc.  She has began to have return of pain and symptoms and she is greater than 6 months status post previous viscosupplementation.  As result of this the patient was provided with intra-articular steroid injection 80 mg Depo-Medrol with lidocaine block injected into the intra-articular space of the right knee.  The patient tolerated this procedure well.  Patient was instructed to return back upon preauthorization to proceed forward with Monovisc. We did discuss the potential for  bracing and continuation of conservative treatment options in an effort to prevent and  avoid total knee arthroplasty.  Patient remains agreeable to current treatment plan of care.        Large Joint Arthrocentesis: R knee  Date/Time: 1/16/2025 10:41 AM  Consent given by: patient  Site marked: site marked  Supporting Documentation  Indications: pain and diagnostic evaluation   Procedure Details  Location: knee - R knee  Needle size: 25 G  Approach: anterolateral  Medications administered: 80 mg methylPREDNISolone acetate 80 MG/ML; 5 mL lidocaine PF 1% 1 %  Patient tolerance: patient tolerated the procedure well with no immediate complications                    This document was signed by Trevor Jones PA-C January 16, 2025    CC: Ladi Rehman MD      Dictated Utilizing Dragon Dictation:   Please note that portions of this note were completed with a voice recognition program.   Part of this note may be an electronic transcription/translation of spoken language to printed text using the Dragon Dictation System.

## 2025-01-30 RX ORDER — METHYLPREDNISOLONE ACETATE 80 MG/ML
80 INJECTION, SUSPENSION INTRA-ARTICULAR; INTRALESIONAL; INTRAMUSCULAR; SOFT TISSUE
Status: COMPLETED | OUTPATIENT
Start: 2025-01-16 | End: 2025-01-16

## 2025-01-30 RX ORDER — LIDOCAINE HYDROCHLORIDE 10 MG/ML
5 INJECTION, SOLUTION EPIDURAL; INFILTRATION; INTRACAUDAL; PERINEURAL
Status: COMPLETED | OUTPATIENT
Start: 2025-01-16 | End: 2025-01-16

## 2025-02-10 ENCOUNTER — OFFICE VISIT (OUTPATIENT)
Dept: ORTHOPEDIC SURGERY | Facility: CLINIC | Age: 70
End: 2025-02-10
Payer: MEDICARE

## 2025-02-10 VITALS — WEIGHT: 119 LBS | HEIGHT: 60 IN | BODY MASS INDEX: 23.36 KG/M2

## 2025-02-10 DIAGNOSIS — M17.11 PRIMARY OSTEOARTHRITIS OF RIGHT KNEE: Primary | ICD-10-CM

## 2025-02-10 RX ORDER — LIDOCAINE HYDROCHLORIDE 10 MG/ML
5 INJECTION, SOLUTION EPIDURAL; INFILTRATION; INTRACAUDAL; PERINEURAL
Status: COMPLETED | OUTPATIENT
Start: 2025-02-10 | End: 2025-02-10

## 2025-02-10 RX ADMIN — LIDOCAINE HYDROCHLORIDE 5 ML: 10 INJECTION, SOLUTION EPIDURAL; INFILTRATION; INTRACAUDAL; PERINEURAL at 10:25

## 2025-02-10 NOTE — PROGRESS NOTES
[facility] Orthopaedic Surgery Established Patient Visit          Patient: KRISTEN Mcdaniel  YOB: 1955  Date of Encounter: 2/10/2025  PCP: Ladi Rehman MD      Subjective     Chief Complaint   Patient presents with    Right Knee - Follow-up           History of Present Illness:     KRISTEN Mcdaniel is a 69 y.o. female presents today for evaluation right knee pain and osteoarthritis. The patient received Monovisc greater than 6 months ago with recent exacerbation and return of pain to which the patient received intra-articular steroid injection at last office visit with resolution of her pain and symptoms.  Patient presents today for discussion of repeat implementation viscosupplementation as she has done well with this previously.  Patient reports dull throbbing aching sensation tricompartmental knee pain worse upon range of motion weightbearing.  Patient denies any other new complaints.         There is no problem list on file for this patient.    Past Medical History:   Diagnosis Date    Osteoporosis     Sleep apnea      History reviewed. No pertinent surgical history.  Social History     Occupational History    Not on file   Tobacco Use    Smoking status: Never    Smokeless tobacco: Never   Vaping Use    Vaping status: Never Used   Substance and Sexual Activity    Alcohol use: Never    Drug use: Never    Sexual activity: Defer    KRISTEN Mcdaniel  reports that she has never smoked. She has never used smokeless tobacco.. I have educated her on the risk of diseases from using tobacco products such as cancer, COPD and heart disease.       Social History     Social History Narrative    Not on file     Family History   Problem Relation Age of Onset    Breast cancer Neg Hx      Current Outpatient Medications   Medication Sig Dispense Refill    acetaminophen (TYLENOL) 650 MG 8 hr tablet Take 1 tablet by mouth Every 8 (Eight) Hours As Needed for Mild Pain.      Diclofenac Sodium (VOLTAREN) 1 % gel gel  "APPLY 2 GRAMS BY TOPICAL ROUTE 2 TIMES PER DAY TOP THE AFFECTED AREA      HYDROcodone-acetaminophen (NORCO) 7.5-325 MG per tablet Take 1 tablet by mouth Every 6 (Six) Hours As Needed for Moderate Pain . 12 tablet 0    levothyroxine (SYNTHROID, LEVOTHROID) 25 MCG tablet Take 1 tablet by mouth Daily.      meloxicam (MOBIC) 7.5 MG tablet Take 1 tablet by mouth Daily.      pravastatin (PRAVACHOL) 40 MG tablet Take 1 tablet by mouth Daily.      vitamin D (ERGOCALCIFEROL) 1.25 MG (85888 UT) capsule capsule Take 1 capsule by mouth 1 (One) Time Per Week.      zolpidem (AMBIEN) 10 MG tablet Take 1 tablet by mouth every night at bedtime.      naproxen sodium (ALEVE) 220 MG tablet Take 1 tablet by mouth 2 (Two) Times a Day As Needed. (Patient not taking: Reported on 2/10/2025)       No current facility-administered medications for this visit.     No Known Allergies       Review of Systems   Constitutional: Negative.   HENT: Negative.     Eyes: Negative.    Cardiovascular: Negative.    Respiratory: Negative.     Endocrine: Negative.    Hematologic/Lymphatic: Negative.    Skin: Negative.    Musculoskeletal:         Pertinent positives listed in HPI   Gastrointestinal: Negative.    Genitourinary: Negative.    Neurological: Negative.    Psychiatric/Behavioral: Negative.     Allergic/Immunologic: Negative.          Objective      Vitals:    02/10/25 1005   Weight: 54 kg (119 lb)   Height: 152.4 cm (60\")     BMI is within normal parameters. No other follow-up for BMI required.      Physical Exam  Vitals and nursing note reviewed.   Constitutional:       General: She is not in acute distress.     Appearance: She is not ill-appearing.   HENT:      Head: Normocephalic and atraumatic.      Right Ear: External ear normal.      Left Ear: External ear normal.      Nose: Nose normal. No congestion or rhinorrhea.   Eyes:      Extraocular Movements: Extraocular movements intact.      Conjunctiva/sclera: Conjunctivae normal.      Pupils: " Pupils are equal, round, and reactive to light.   Cardiovascular:      Rate and Rhythm: Normal rate.      Pulses: Normal pulses.   Pulmonary:      Effort: Pulmonary effort is normal. No respiratory distress.      Breath sounds: No stridor.   Abdominal:      General: There is no distension.   Musculoskeletal:      Cervical back: Normal range of motion.      Comments: Right knee on examination today reveals obvious varus deformity with laxity upon valgus stress.  Patient exhibits full flexion and extension with no effusion.  Patellofemoral crepitus present.  Return of previous medial jointline tenderness. Lachman and drawer testing negative.  Bindu's and Apley's grind testing negative. Neurovascular status grossly intact right lower extremity.    Skin:     General: Skin is warm and dry.      Capillary Refill: Capillary refill takes less than 2 seconds.   Neurological:      General: No focal deficit present.      Mental Status: She is alert and oriented to person, place, and time.   Psychiatric:         Mood and Affect: Mood normal.         Behavior: Behavior normal.         Thought Content: Thought content normal.         Judgment: Judgment normal.             Assessment/Plan        ICD-10-CM ICD-9-CM   1. Primary osteoarthritis of right knee  M17.11 715.16       68-year-old female with notable right knee osteoarthritis and tricompartmental degenerative changes.  The patient has responded well in the past to intra-articular steroid injections as well as previous viscosupplementation with Monovisc.  She has began to have return of pain and symptoms and she is greater than 6 months status post previous viscosupplementation.  Today, the patient was provided with 4 mL Monovisc with lidocaine block injected into the intra-articular space of the right knee.  Patient tolerated this procedure well.  She will continue with the bracing, medication, conservative treatment options as an effort to forego surgical intervention.   The patient is agreeable to current treatment plan of care.  She tolerated the aforementioned injection well.       Large Joint Arthrocentesis: R knee  Date/Time: 2/10/2025 10:25 AM  Consent given by: patient  Site marked: site marked  Timeout: Immediately prior to procedure a time out was called to verify the correct patient, procedure, equipment, support staff and site/side marked as required   Supporting Documentation  Indications: pain and diagnostic evaluation   Procedure Details  Location: knee - R knee  Needle size: 20 G  Approach: lateral  Medications administered: 5 mL lidocaine PF 1% 1 %; 88 mg Hyaluronan 88 MG/4ML  Patient tolerance: patient tolerated the procedure well with no immediate complications                    This document was signed by Trevor Jones PA-C February 10, 2025    CC: Ladi Rehman MD      Dictated Utilizing Dragon Dictation:   Please note that portions of this note were completed with a voice recognition program.   Part of this note may be an electronic transcription/translation of spoken language to printed text using the Dragon Dictation System.

## 2025-04-25 ENCOUNTER — OFFICE VISIT (OUTPATIENT)
Dept: ORTHOPEDIC SURGERY | Facility: CLINIC | Age: 70
End: 2025-04-25
Payer: MEDICARE

## 2025-04-25 VITALS — BODY MASS INDEX: 23.36 KG/M2 | WEIGHT: 119 LBS | HEIGHT: 60 IN

## 2025-04-25 DIAGNOSIS — M17.11 PRIMARY OSTEOARTHRITIS OF RIGHT KNEE: Primary | ICD-10-CM

## 2025-04-25 RX ADMIN — METHYLPREDNISOLONE ACETATE 40 MG: 40 INJECTION, SUSPENSION INTRA-ARTICULAR; INTRALESIONAL; INTRAMUSCULAR; SOFT TISSUE at 11:35

## 2025-04-25 RX ADMIN — LIDOCAINE HYDROCHLORIDE 5 ML: 10 INJECTION, SOLUTION EPIDURAL; INFILTRATION; INTRACAUDAL; PERINEURAL at 11:35

## 2025-04-25 NOTE — PROGRESS NOTES
[facility] Orthopaedic Surgery Established Patient Visit          Patient: KRISTEN Mcdaniel  YOB: 1955  Date of Encounter: 4/25/2025  PCP: Ladi Rehman MD      Subjective     Chief Complaint   Patient presents with    Right Knee - Pain, Follow-up           History of Present Illness:     KRISTEN Mcdaniel is a 69 y.o. female presents today for evaluation right knee pain and osteoarthritis. The patient received Monovisc previously with notable alleviation of pain symptoms.  Patient reports mild return of pain symptoms over the last month.  The patient is greater than 2 months status post previous viscosupplementation and three months s/p intra-articular steroid injection.  She has questions in regards to repeat infusion today and further conservative treatment options.  Denies any paresthesias.      There is no problem list on file for this patient.    Past Medical History:   Diagnosis Date    Osteoporosis     Sleep apnea      History reviewed. No pertinent surgical history.  Social History     Occupational History    Not on file   Tobacco Use    Smoking status: Never    Smokeless tobacco: Never   Vaping Use    Vaping status: Never Used   Substance and Sexual Activity    Alcohol use: Never    Drug use: Never    Sexual activity: Defer    KRISTEN Mcdaniel  reports that she has never smoked. She has never used smokeless tobacco.. I have educated her on the risk of diseases from using tobacco products such as cancer, COPD and heart disease.       Social History     Social History Narrative    Not on file     Family History   Problem Relation Age of Onset    Breast cancer Neg Hx      Current Outpatient Medications   Medication Sig Dispense Refill    acetaminophen (TYLENOL) 650 MG 8 hr tablet Take 1 tablet by mouth Every 8 (Eight) Hours As Needed for Mild Pain.      Diclofenac Sodium (VOLTAREN) 1 % gel gel APPLY 2 GRAMS BY TOPICAL ROUTE 2 TIMES PER DAY TOP THE AFFECTED AREA      levothyroxine (SYNTHROID,  "LEVOTHROID) 25 MCG tablet Take 1 tablet by mouth Daily.      meloxicam (MOBIC) 7.5 MG tablet Take 1 tablet by mouth Daily.      naproxen sodium (ALEVE) 220 MG tablet Take 1 tablet by mouth 2 (Two) Times a Day As Needed.      pravastatin (PRAVACHOL) 40 MG tablet Take 1 tablet by mouth Daily.      vitamin D (ERGOCALCIFEROL) 1.25 MG (93708 UT) capsule capsule Take 1 capsule by mouth 1 (One) Time Per Week.      zolpidem (AMBIEN) 10 MG tablet Take 1 tablet by mouth every night at bedtime.      HYDROcodone-acetaminophen (NORCO) 7.5-325 MG per tablet Take 1 tablet by mouth Every 6 (Six) Hours As Needed for Moderate Pain . (Patient not taking: Reported on 4/25/2025) 12 tablet 0     No current facility-administered medications for this visit.     No Known Allergies       Review of Systems   Constitutional: Negative.   HENT: Negative.     Eyes: Negative.    Cardiovascular: Negative.    Respiratory: Negative.     Endocrine: Negative.    Hematologic/Lymphatic: Negative.    Skin: Negative.    Musculoskeletal:         Pertinent positives listed in HPI   Gastrointestinal: Negative.    Genitourinary: Negative.    Neurological: Negative.    Psychiatric/Behavioral: Negative.     Allergic/Immunologic: Negative.          Objective      Vitals:    04/25/25 1100   Weight: 54 kg (119 lb)   Height: 152.4 cm (60\")     BMI is within normal parameters. No other follow-up for BMI required.      Physical Exam  Vitals and nursing note reviewed.   Constitutional:       General: She is not in acute distress.     Appearance: She is not ill-appearing.   HENT:      Head: Normocephalic and atraumatic.      Right Ear: External ear normal.      Left Ear: External ear normal.      Nose: Nose normal. No congestion or rhinorrhea.   Eyes:      Extraocular Movements: Extraocular movements intact.      Conjunctiva/sclera: Conjunctivae normal.      Pupils: Pupils are equal, round, and reactive to light.   Cardiovascular:      Rate and Rhythm: Normal rate.      " Pulses: Normal pulses.   Pulmonary:      Effort: Pulmonary effort is normal. No respiratory distress.      Breath sounds: No stridor.   Abdominal:      General: There is no distension.   Musculoskeletal:      Cervical back: Normal range of motion.      Comments: Right knee on examination today reveals obvious varus deformity with laxity upon valgus stress.  Patient exhibits full flexion and extension with no effusion.  Patellofemoral crepitus present.  Return of previous medial jointline tenderness. Lachman and drawer testing negative.  Bindu's and Apley's grind testing negative. Neurovascular status grossly intact right lower extremity.    Skin:     General: Skin is warm and dry.      Capillary Refill: Capillary refill takes less than 2 seconds.   Neurological:      General: No focal deficit present.      Mental Status: She is alert and oriented to person, place, and time.   Psychiatric:         Mood and Affect: Mood normal.         Behavior: Behavior normal.         Thought Content: Thought content normal.         Judgment: Judgment normal.             Assessment/Plan        ICD-10-CM ICD-9-CM   1. Primary osteoarthritis of right knee  M17.11 715.16         68-year-old female with notable right knee osteoarthritis and tricompartmental degenerative changes.  The patient has responded well in the past to intra-articular steroid injections as well as previous viscosupplementation with Monovisc.  She has began to have return of pain and symptoms and she is greater than 2 months status post previous viscosupplementation and 3 months s/p steroidal injection.  As result of this the patient was provided with intra-articular steroid injection 40 mg Depo-Medrol with lidocaine block injected into the intra-articular space of the right knee. The patient tolerated this procedure well.  Patient was instructed to return in 2 weeks to discuss left knee. We did discuss the potential for  bracing and continuation of  conservative treatment options in an effort to prevent and avoid total knee arthroplasty.  Patient remains agreeable to current treatment plan of care.        Large Joint Arthrocentesis: R knee  Date/Time: 4/25/2025 11:35 AM  Consent given by: patient  Site marked: site marked  Supporting Documentation  Indications: pain and diagnostic evaluation   Procedure Details  Location: knee - R knee  Needle size: 25 G  Approach: anterolateral  Medications administered: 5 mL lidocaine PF 1% 1 %; 40 mg methylPREDNISolone acetate 40 MG/ML  Patient tolerance: patient tolerated the procedure well with no immediate complications                    This document was signed by Trevor Jones PA-C April 25, 2025    CC: Ladi Rehman MD      Dictated Utilizing Dragon Dictation:   Please note that portions of this note were completed with a voice recognition program.   Part of this note may be an electronic transcription/translation of spoken language to printed text using the Dragon Dictation System.

## 2025-05-13 RX ORDER — METHYLPREDNISOLONE ACETATE 40 MG/ML
40 INJECTION, SUSPENSION INTRA-ARTICULAR; INTRALESIONAL; INTRAMUSCULAR; SOFT TISSUE
Status: COMPLETED | OUTPATIENT
Start: 2025-04-25 | End: 2025-04-25

## 2025-05-13 RX ORDER — LIDOCAINE HYDROCHLORIDE 10 MG/ML
5 INJECTION, SOLUTION EPIDURAL; INFILTRATION; INTRACAUDAL; PERINEURAL
Status: COMPLETED | OUTPATIENT
Start: 2025-04-25 | End: 2025-04-25

## 2025-07-16 ENCOUNTER — HOSPITAL ENCOUNTER (EMERGENCY)
Facility: HOSPITAL | Age: 70
Discharge: HOME | End: 2025-07-17
Payer: MEDICARE

## 2025-07-16 ENCOUNTER — APPOINTMENT (OUTPATIENT)
Dept: CT IMAGING | Facility: HOSPITAL | Age: 70
End: 2025-07-16
Payer: MEDICARE

## 2025-07-16 DIAGNOSIS — R33.8 ACUTE URINARY RETENTION: ICD-10-CM

## 2025-07-16 DIAGNOSIS — K52.9 COLITIS: Primary | ICD-10-CM

## 2025-07-16 LAB
ALBUMIN SERPL-MCNC: 4.4 G/DL (ref 3.5–5.2)
ALBUMIN/GLOB SERPL: 1.8 G/DL
ALP SERPL-CCNC: 85 U/L (ref 39–117)
ALT SERPL W P-5'-P-CCNC: 15 U/L (ref 1–33)
ANION GAP SERPL CALCULATED.3IONS-SCNC: 15.1 MMOL/L (ref 5–15)
AST SERPL-CCNC: 25 U/L (ref 1–32)
BASOPHILS # BLD AUTO: 0.05 10*3/MM3 (ref 0–0.2)
BASOPHILS NFR BLD AUTO: 0.3 % (ref 0–1.5)
BILIRUB SERPL-MCNC: 0.7 MG/DL (ref 0–1.2)
BILIRUB UR QL STRIP: NEGATIVE
BUN SERPL-MCNC: 10.6 MG/DL (ref 8–23)
BUN/CREAT SERPL: 20 (ref 7–25)
CALCIUM SPEC-SCNC: 8.9 MG/DL (ref 8.6–10.5)
CHLORIDE SERPL-SCNC: 98 MMOL/L (ref 98–107)
CLARITY UR: CLEAR
CO2 SERPL-SCNC: 19.9 MMOL/L (ref 22–29)
COLOR UR: YELLOW
CREAT SERPL-MCNC: 0.53 MG/DL (ref 0.57–1)
DEPRECATED RDW RBC AUTO: 41 FL (ref 37–54)
EGFRCR SERPLBLD CKD-EPI 2021: 99 ML/MIN/1.73
EOSINOPHIL # BLD AUTO: 0 10*3/MM3 (ref 0–0.4)
EOSINOPHIL NFR BLD AUTO: 0 % (ref 0.3–6.2)
ERYTHROCYTE [DISTWIDTH] IN BLOOD BY AUTOMATED COUNT: 12.5 % (ref 12.3–15.4)
GLOBULIN UR ELPH-MCNC: 2.5 GM/DL
GLUCOSE SERPL-MCNC: 169 MG/DL (ref 65–99)
GLUCOSE UR STRIP-MCNC: NEGATIVE MG/DL
HCT VFR BLD AUTO: 41.2 % (ref 34–46.6)
HGB BLD-MCNC: 14.3 G/DL (ref 12–15.9)
HGB UR QL STRIP.AUTO: NEGATIVE
IMM GRANULOCYTES # BLD AUTO: 0.1 10*3/MM3 (ref 0–0.05)
IMM GRANULOCYTES NFR BLD AUTO: 0.6 % (ref 0–0.5)
KETONES UR QL STRIP: ABNORMAL
LEUKOCYTE ESTERASE UR QL STRIP.AUTO: NEGATIVE
LYMPHOCYTES # BLD AUTO: 0.47 10*3/MM3 (ref 0.7–3.1)
LYMPHOCYTES NFR BLD AUTO: 2.6 % (ref 19.6–45.3)
MCH RBC QN AUTO: 30.8 PG (ref 26.6–33)
MCHC RBC AUTO-ENTMCNC: 34.7 G/DL (ref 31.5–35.7)
MCV RBC AUTO: 88.8 FL (ref 79–97)
MONOCYTES # BLD AUTO: 0.53 10*3/MM3 (ref 0.1–0.9)
MONOCYTES NFR BLD AUTO: 2.9 % (ref 5–12)
NEUTROPHILS NFR BLD AUTO: 16.85 10*3/MM3 (ref 1.7–7)
NEUTROPHILS NFR BLD AUTO: 93.6 % (ref 42.7–76)
NITRITE UR QL STRIP: NEGATIVE
NRBC BLD AUTO-RTO: 0 /100 WBC (ref 0–0.2)
PH UR STRIP.AUTO: 5.5 [PH] (ref 5–8)
PLATELET # BLD AUTO: 242 10*3/MM3 (ref 140–450)
PMV BLD AUTO: 10.4 FL (ref 6–12)
POTASSIUM SERPL-SCNC: 3.9 MMOL/L (ref 3.5–5.2)
PROT SERPL-MCNC: 6.9 G/DL (ref 6–8.5)
PROT UR QL STRIP: NEGATIVE
RBC # BLD AUTO: 4.64 10*6/MM3 (ref 3.77–5.28)
SODIUM SERPL-SCNC: 133 MMOL/L (ref 136–145)
SP GR UR STRIP: 1.02 (ref 1–1.03)
UROBILINOGEN UR QL STRIP: ABNORMAL
WBC NRBC COR # BLD AUTO: 18 10*3/MM3 (ref 3.4–10.8)

## 2025-07-16 PROCEDURE — 85025 COMPLETE CBC W/AUTO DIFF WBC: CPT

## 2025-07-16 PROCEDURE — 84145 PROCALCITONIN (PCT): CPT

## 2025-07-16 PROCEDURE — 25510000001 IOPAMIDOL 61 % SOLUTION

## 2025-07-16 PROCEDURE — 25010000002 HYDROMORPHONE PER 4 MG

## 2025-07-16 PROCEDURE — 74177 CT ABD & PELVIS W/CONTRAST: CPT

## 2025-07-16 PROCEDURE — 81003 URINALYSIS AUTO W/O SCOPE: CPT

## 2025-07-16 PROCEDURE — 36415 COLL VENOUS BLD VENIPUNCTURE: CPT

## 2025-07-16 PROCEDURE — 99285 EMERGENCY DEPT VISIT HI MDM: CPT

## 2025-07-16 PROCEDURE — 25010000002 ONDANSETRON PER 1 MG

## 2025-07-16 PROCEDURE — 25810000003 LACTATED RINGERS SOLUTION

## 2025-07-16 PROCEDURE — 96375 TX/PRO/DX INJ NEW DRUG ADDON: CPT

## 2025-07-16 PROCEDURE — 80053 COMPREHEN METABOLIC PANEL: CPT

## 2025-07-16 RX ORDER — IOPAMIDOL 612 MG/ML
100 INJECTION, SOLUTION INTRAVASCULAR
Status: COMPLETED | OUTPATIENT
Start: 2025-07-16 | End: 2025-07-16

## 2025-07-16 RX ORDER — ONDANSETRON 2 MG/ML
4 INJECTION INTRAMUSCULAR; INTRAVENOUS ONCE
Status: COMPLETED | OUTPATIENT
Start: 2025-07-17 | End: 2025-07-16

## 2025-07-16 RX ORDER — HYDROMORPHONE HYDROCHLORIDE 1 MG/ML
0.5 INJECTION, SOLUTION INTRAMUSCULAR; INTRAVENOUS; SUBCUTANEOUS ONCE
Status: COMPLETED | OUTPATIENT
Start: 2025-07-17 | End: 2025-07-16

## 2025-07-16 RX ADMIN — HYDROMORPHONE HYDROCHLORIDE 0.5 MG: 1 INJECTION, SOLUTION INTRAMUSCULAR; INTRAVENOUS; SUBCUTANEOUS at 23:59

## 2025-07-16 RX ADMIN — IOPAMIDOL 70 ML: 612 INJECTION, SOLUTION INTRAVENOUS at 23:42

## 2025-07-16 RX ADMIN — ONDANSETRON 4 MG: 2 INJECTION INTRAMUSCULAR; INTRAVENOUS at 23:59

## 2025-07-16 RX ADMIN — SODIUM CHLORIDE, POTASSIUM CHLORIDE, SODIUM LACTATE AND CALCIUM CHLORIDE 1000 ML: 600; 310; 30; 20 INJECTION, SOLUTION INTRAVENOUS at 22:36

## 2025-07-17 VITALS
RESPIRATION RATE: 16 BRPM | BODY MASS INDEX: 22.58 KG/M2 | HEART RATE: 93 BPM | DIASTOLIC BLOOD PRESSURE: 70 MMHG | HEIGHT: 60 IN | SYSTOLIC BLOOD PRESSURE: 139 MMHG | WEIGHT: 115 LBS | TEMPERATURE: 98.4 F | OXYGEN SATURATION: 99 %

## 2025-07-17 LAB
D-LACTATE SERPL-SCNC: 2.1 MMOL/L (ref 0.5–2)
PROCALCITONIN SERPL-MCNC: 0.18 NG/ML (ref 0–0.25)

## 2025-07-17 PROCEDURE — 25010000002 FENTANYL CITRATE (PF) 50 MCG/ML SOLUTION

## 2025-07-17 PROCEDURE — 51702 INSERT TEMP BLADDER CATH: CPT

## 2025-07-17 PROCEDURE — 83605 ASSAY OF LACTIC ACID: CPT

## 2025-07-17 PROCEDURE — 87040 BLOOD CULTURE FOR BACTERIA: CPT

## 2025-07-17 PROCEDURE — 96376 TX/PRO/DX INJ SAME DRUG ADON: CPT

## 2025-07-17 PROCEDURE — 25010000002 ONDANSETRON PER 1 MG

## 2025-07-17 PROCEDURE — 25010000002 PROCHLORPERAZINE 10 MG/2ML SOLUTION

## 2025-07-17 PROCEDURE — 96375 TX/PRO/DX INJ NEW DRUG ADDON: CPT

## 2025-07-17 PROCEDURE — 25010000002 PIPERACILLIN SOD-TAZOBACTAM PER 1 G

## 2025-07-17 PROCEDURE — 96365 THER/PROPH/DIAG IV INF INIT: CPT

## 2025-07-17 RX ORDER — PROCHLORPERAZINE EDISYLATE 5 MG/ML
5 INJECTION INTRAMUSCULAR; INTRAVENOUS EVERY 6 HOURS PRN
Status: DISCONTINUED | OUTPATIENT
Start: 2025-07-17 | End: 2025-07-17 | Stop reason: HOSPADM

## 2025-07-17 RX ORDER — METRONIDAZOLE 500 MG/1
500 TABLET ORAL 2 TIMES DAILY
Qty: 10 TABLET | Refills: 0 | Status: SHIPPED | OUTPATIENT
Start: 2025-07-17 | End: 2025-07-22

## 2025-07-17 RX ORDER — CIPROFLOXACIN 500 MG/1
500 TABLET, FILM COATED ORAL 2 TIMES DAILY
Qty: 10 TABLET | Refills: 0 | Status: SHIPPED | OUTPATIENT
Start: 2025-07-17 | End: 2025-07-22

## 2025-07-17 RX ORDER — SENNOSIDES 8.6 MG/1
1 TABLET ORAL NIGHTLY
Status: DISCONTINUED | OUTPATIENT
Start: 2025-07-17 | End: 2025-07-17

## 2025-07-17 RX ORDER — POLYETHYLENE GLYCOL 3350 17 G/17G
17 POWDER, FOR SOLUTION ORAL DAILY
Status: DISCONTINUED | OUTPATIENT
Start: 2025-07-17 | End: 2025-07-17

## 2025-07-17 RX ORDER — FENTANYL CITRATE 50 UG/ML
50 INJECTION, SOLUTION INTRAMUSCULAR; INTRAVENOUS ONCE
Refills: 0 | Status: COMPLETED | OUTPATIENT
Start: 2025-07-17 | End: 2025-07-17

## 2025-07-17 RX ORDER — ONDANSETRON 2 MG/ML
4 INJECTION INTRAMUSCULAR; INTRAVENOUS ONCE
Status: COMPLETED | OUTPATIENT
Start: 2025-07-17 | End: 2025-07-17

## 2025-07-17 RX ORDER — FENTANYL CITRATE 50 UG/ML
1 INJECTION, SOLUTION INTRAMUSCULAR; INTRAVENOUS ONCE
Refills: 0 | Status: DISCONTINUED | OUTPATIENT
Start: 2025-07-17 | End: 2025-07-17

## 2025-07-17 RX ADMIN — PIPERACILLIN AND TAZOBACTAM 3.38 G: 3; .375 INJECTION, POWDER, FOR SOLUTION INTRAVENOUS at 00:14

## 2025-07-17 RX ADMIN — PROCHLORPERAZINE EDISYLATE 5 MG: 5 INJECTION INTRAMUSCULAR; INTRAVENOUS at 02:44

## 2025-07-17 RX ADMIN — FENTANYL CITRATE 50 MCG: 50 INJECTION, SOLUTION INTRAMUSCULAR; INTRAVENOUS at 00:35

## 2025-07-17 RX ADMIN — SODIUM CHLORIDE, POTASSIUM CHLORIDE, SODIUM LACTATE AND CALCIUM CHLORIDE 1000 ML: 600; 310; 30; 20 INJECTION, SOLUTION INTRAVENOUS at 00:00

## 2025-07-17 RX ADMIN — Medication 50 MG: at 02:11

## 2025-07-17 RX ADMIN — ONDANSETRON 4 MG: 2 INJECTION INTRAMUSCULAR; INTRAVENOUS at 02:26

## 2025-07-17 NOTE — ED PROVIDER NOTES
Subjective   History of Present Illness  LUIS M SHAW is a 70-year-old female with a past medical history of osteoporosis presenting to the emergency department concern of difficulty urinating.  Reports she began experiencing symptoms around 12:00 today when she reports constipation she attempted to use a suppository and is now currently leaking stool however reports she has had difficulty urinating where she feels she cannot evacuate her bladder.  She endorses suprapubic abdominal pain and discomfort but denies abdominal pain elsewhere.  She denies any fever or chills, URI-like symptoms no chest pain or difficulty with breathing, denies any vomiting, vaginal bleeding or vaginal discharge she does report leakage of stool that she believes is related to her diverticular disease         Review of Systems    Past Medical History:   Diagnosis Date    Osteoporosis     Sleep apnea        Allergies   Allergen Reactions    Morphine Nausea And Vomiting       No past surgical history on file.    Family History   Problem Relation Age of Onset    Breast cancer Neg Hx        Social History     Socioeconomic History    Marital status:    Tobacco Use    Smoking status: Never    Smokeless tobacco: Never   Vaping Use    Vaping status: Never Used   Substance and Sexual Activity    Alcohol use: Never    Drug use: Never    Sexual activity: Defer           Objective   Physical Exam  Vitals and nursing note reviewed.   Constitutional:       General: She is not in acute distress.     Appearance: She is well-developed. She is not ill-appearing, toxic-appearing or diaphoretic.   HENT:      Head: Normocephalic and atraumatic.   Eyes:      General: No scleral icterus.  Neck:      Vascular: No JVD.      Trachea: No tracheal deviation.   Cardiovascular:      Rate and Rhythm: Regular rhythm. Tachycardia present.      Heart sounds: Normal heart sounds. No murmur heard.  Pulmonary:      Effort: Pulmonary effort is normal. No respiratory  distress.      Breath sounds: Normal breath sounds. No wheezing.   Abdominal:      General: Bowel sounds are normal.      Palpations: Abdomen is soft.      Tenderness: There is abdominal tenderness (Significant tenderness in the suprapubic region). There is no guarding or rebound.   Skin:     General: Skin is warm and dry.      Capillary Refill: Capillary refill takes less than 2 seconds.   Neurological:      Mental Status: She is alert.   Psychiatric:         Thought Content: Thought content normal.         Procedures           ED Course                                                       Medical Decision Making  Patient is a 70-year-old female presenting to the emergency department [place] of difficulty urinating.    Differential diagnose includes but not limited to urinary tract infection, bladder outlet obstruction, urolithiasis, malignancy, obstipation    Amount and/or Complexity of Data Reviewed  Labs: ordered.  Radiology: ordered.        Final diagnoses:   None       ED Disposition  ED Disposition       None            No follow-up provider specified.       Medication List      No changes were made to your prescriptions during this visit.          however given her significant urine output I advocated for the patient to be admitted for obs period however the hospitalist disagreed this was the best disposition for the patient as her vital have normalized and patient is comfortable going home with pedraza catheter for retention and antibiotics to treat colitis with return to the ED precautions. All questions and concerns addressed before discharge today.       Problems Addressed:  Acute urinary retention: complicated acute illness or injury  Colitis: complicated acute illness or injury    Amount and/or Complexity of Data Reviewed  External Data Reviewed:      Details: Review note from 4/25/25 for knee pain.   Labs: ordered.  Radiology: ordered and independent interpretation performed.     Details: CT abd pelvis remarkable for significant distention of the bladder     Risk  Prescription drug management.        Final diagnoses:   Colitis   Acute urinary retention       ED Disposition  ED Disposition       ED Disposition   Discharge    Condition   Stable    Comment   --               Ladi Rehman MD  [address] [place] [id number] [phone]    Schedule an appointment as soon as possible for a visit       Guilherme Rivas PA-C  [address] [facility] [address]  [phone]    Schedule an appointment as soon as possible for a visit            Medication List        New Prescriptions      ciprofloxacin 500 MG tablet  Commonly known as: CIPRO  Take 1 tablet by mouth 2 (Two) Times a Day for 5 days.     metroNIDAZOLE 500 MG tablet  Commonly known as: FLAGYL  Take 1 tablet by mouth 2 (Two) Times a Day for 5 days.               Where to Get Your Medications        These medications were sent to [facility] [address] [place] [address] [address] [facility] - [phone] [place] [phone]   [address] [facility] [address]      Phone: [phone]   ciprofloxacin 500 MG tablet  metroNIDAZOLE 500 MG tablet            Aram  MD Adina  07/22/25 0011

## 2025-07-17 NOTE — ED NOTES
Patient began vomiting and dry-heaving after ketamine administration, Dr. Mobley made aware and new orders being placed at this time.

## 2025-07-22 LAB
BACTERIA SPEC AEROBE CULT: NORMAL
BACTERIA SPEC AEROBE CULT: NORMAL

## 2025-07-22 NOTE — ED PROVIDER NOTES
Subjective   History of Present Illness  LUIS M SHAW is a 70-year-old female with a past medical history of osteoporosis presenting to the emergency department concern of difficulty urinating.  Reports she began experiencing symptoms around 12:00 today when she reports constipation she attempted to use a suppository and is now currently leaking stool however reports she has had difficulty urinating where she feels she cannot evacuate her bladder.  She endorses suprapubic abdominal pain and discomfort but denies abdominal pain elsewhere.  She denies any fever or chills, URI-like symptoms no chest pain or difficulty with breathing, denies any vomiting, vaginal bleeding or vaginal discharge she does report leakage of stool that she believes is related to her diverticular disease         Review of Systems    Past Medical History:   Diagnosis Date   • Osteoporosis    • Sleep apnea        Allergies   Allergen Reactions   • Morphine Nausea And Vomiting       No past surgical history on file.    Family History   Problem Relation Age of Onset   • Breast cancer Neg Hx        Social History     Socioeconomic History   • Marital status:    Tobacco Use   • Smoking status: Never   • Smokeless tobacco: Never   Vaping Use   • Vaping status: Never Used   Substance and Sexual Activity   • Alcohol use: Never   • Drug use: Never   • Sexual activity: Defer           Objective   Physical Exam  Vitals and nursing note reviewed.   Constitutional:       General: She is not in acute distress.     Appearance: She is well-developed. She is not ill-appearing, toxic-appearing or diaphoretic.   HENT:      Head: Normocephalic and atraumatic.   Eyes:      General: No scleral icterus.  Neck:      Vascular: No JVD.      Trachea: No tracheal deviation.   Cardiovascular:      Rate and Rhythm: Regular rhythm. Tachycardia present.   Pulmonary:      Effort: Pulmonary effort is normal. No respiratory distress.      Breath sounds: Normal breath  sounds. No wheezing.   Abdominal:      General: Bowel sounds are normal.      Palpations: Abdomen is soft.      Tenderness: There is abdominal tenderness (Significant tenderness in the suprapubic region). There is no guarding or rebound.   Skin:     General: Skin is warm and dry.      Capillary Refill: Capillary refill takes less than 2 seconds.   Neurological:      Mental Status: She is alert.   Psychiatric:         Thought Content: Thought content normal.         Procedures           ED Course                                                       Medical Decision Making  Patient is a 70-year-old female presenting to the emergency department [place] of difficulty urinating.    Differential diagnose includes but not limited to urinary tract infection, bladder outlet obstruction, urolithiasis, malignancy, obstipation    Problems Addressed:  Acute urinary retention: complicated acute illness or injury  Colitis: complicated acute illness or injury    Amount and/or Complexity of Data Reviewed  Labs: ordered.  Radiology: ordered.    Risk  Prescription drug management.        Final diagnoses:   None       ED Disposition  ED Disposition       None            No follow-up provider specified.       Medication List      No changes were made to your prescriptions during this visit.